# Patient Record
Sex: FEMALE | Race: BLACK OR AFRICAN AMERICAN | Employment: UNEMPLOYED | URBAN - METROPOLITAN AREA
[De-identification: names, ages, dates, MRNs, and addresses within clinical notes are randomized per-mention and may not be internally consistent; named-entity substitution may affect disease eponyms.]

---

## 2018-11-12 ENCOUNTER — HOSPITAL ENCOUNTER (EMERGENCY)
Age: 48
Discharge: HOME | End: 2018-11-12
Attending: EMERGENCY MEDICINE
Payer: SELF-PAY

## 2018-11-12 ENCOUNTER — APPOINTMENT (OUTPATIENT)
Dept: GENERAL RADIOLOGY | Age: 48
End: 2018-11-12
Attending: PHYSICIAN ASSISTANT
Payer: SELF-PAY

## 2018-11-12 ENCOUNTER — APPOINTMENT (OUTPATIENT)
Dept: CT IMAGING | Age: 48
End: 2018-11-12
Attending: PHYSICIAN ASSISTANT
Payer: SELF-PAY

## 2018-11-12 VITALS
OXYGEN SATURATION: 98 % | HEART RATE: 82 BPM | RESPIRATION RATE: 18 BRPM | DIASTOLIC BLOOD PRESSURE: 86 MMHG | SYSTOLIC BLOOD PRESSURE: 149 MMHG | HEIGHT: 64 IN | TEMPERATURE: 98.3 F | BODY MASS INDEX: 40.08 KG/M2 | WEIGHT: 234.79 LBS
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
## 2018-11-12 DIAGNOSIS — M25.551 PAIN OF RIGHT HIP JOINT: Primary | ICD-10-CM

## 2018-11-12 LAB — HCG UR QL: NEGATIVE

## 2018-11-12 PROCEDURE — 99283 EMERGENCY DEPT VISIT LOW MDM: CPT

## 2018-11-12 PROCEDURE — 73700 CT LOWER EXTREMITY W/O DYE: CPT

## 2018-11-12 PROCEDURE — 81025 URINE PREGNANCY TEST: CPT

## 2018-11-12 PROCEDURE — 74011250637 HC RX REV CODE- 250/637: Performed by: PHYSICIAN ASSISTANT

## 2018-11-12 PROCEDURE — 72100 X-RAY EXAM L-S SPINE 2/3 VWS: CPT

## 2018-11-12 RX ORDER — HYDROCODONE BITARTRATE AND ACETAMINOPHEN 7.5; 325 MG/1; MG/1
1 TABLET ORAL
Status: COMPLETED | OUTPATIENT
Start: 2018-11-12 | End: 2018-11-12

## 2018-11-12 RX ORDER — HYDROCODONE BITARTRATE AND ACETAMINOPHEN 5; 325 MG/1; MG/1
1 TABLET ORAL
Qty: 15 TAB | Refills: 0 | Status: SHIPPED | OUTPATIENT
Start: 2018-11-12 | End: 2022-04-19 | Stop reason: ALTCHOICE

## 2018-11-12 RX ORDER — CYCLOBENZAPRINE HCL 10 MG
10 TABLET ORAL
Qty: 15 TAB | Refills: 0 | Status: SHIPPED | OUTPATIENT
Start: 2018-11-12 | End: 2022-04-19 | Stop reason: ALTCHOICE

## 2018-11-12 RX ADMIN — HYDROCODONE BITARTRATE AND ACETAMINOPHEN 1 TABLET: 7.5; 325 TABLET ORAL at 21:24
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
## 2018-11-13 NOTE — ED NOTES
Patient has received discharge instructions from ER PA, verbalizes understanding. Ambulatory upon discharge with family driving.
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
## 2020-08-07 ENCOUNTER — TELEPHONE (OUTPATIENT)
Dept: CARDIOLOGY CLINIC | Age: 50
End: 2020-08-07

## 2020-08-07 ENCOUNTER — OFFICE VISIT (OUTPATIENT)
Dept: CARDIOLOGY CLINIC | Age: 50
End: 2020-08-07

## 2020-08-07 VITALS
SYSTOLIC BLOOD PRESSURE: 100 MMHG | RESPIRATION RATE: 16 BRPM | HEART RATE: 89 BPM | WEIGHT: 237 LBS | HEIGHT: 64 IN | DIASTOLIC BLOOD PRESSURE: 60 MMHG | OXYGEN SATURATION: 96 % | BODY MASS INDEX: 40.46 KG/M2

## 2020-08-07 DIAGNOSIS — R06.02 SOB (SHORTNESS OF BREATH): Primary | ICD-10-CM

## 2020-08-07 DIAGNOSIS — R06.02 SOB (SHORTNESS OF BREATH): ICD-10-CM

## 2020-08-07 DIAGNOSIS — Z98.890 H/O TRICUSPID VALVE REPAIR: ICD-10-CM

## 2020-08-07 DIAGNOSIS — Z95.0 PACEMAKER: ICD-10-CM

## 2020-08-07 DIAGNOSIS — Z95.2 H/O MITRAL VALVE REPLACEMENT: ICD-10-CM

## 2020-08-07 DIAGNOSIS — R60.0 LOCALIZED EDEMA: ICD-10-CM

## 2020-08-07 PROCEDURE — 99204 OFFICE O/P NEW MOD 45 MIN: CPT | Performed by: SPECIALIST

## 2020-08-07 PROCEDURE — 93000 ELECTROCARDIOGRAM COMPLETE: CPT | Performed by: SPECIALIST

## 2020-08-07 RX ORDER — ALBUTEROL SULFATE 90 UG/1
AEROSOL, METERED RESPIRATORY (INHALATION)
COMMUNITY
Start: 2019-05-21 | End: 2022-04-19 | Stop reason: SDUPTHER

## 2020-08-07 RX ORDER — POTASSIUM CHLORIDE 20 MEQ/1
TABLET, EXTENDED RELEASE ORAL
COMMUNITY

## 2020-08-07 RX ORDER — SPIRONOLACTONE 25 MG/1
25 TABLET ORAL DAILY
COMMUNITY
End: 2022-04-19 | Stop reason: ALTCHOICE

## 2020-08-07 RX ORDER — BUDESONIDE AND FORMOTEROL FUMARATE DIHYDRATE 80; 4.5 UG/1; UG/1
AEROSOL RESPIRATORY (INHALATION)
COMMUNITY

## 2020-08-07 RX ORDER — CARVEDILOL 3.12 MG/1
TABLET ORAL
COMMUNITY
End: 2022-04-19 | Stop reason: ALTCHOICE

## 2020-08-07 RX ORDER — FUROSEMIDE 40 MG/1
TABLET ORAL
COMMUNITY
End: 2022-04-19 | Stop reason: ALTCHOICE

## 2020-08-07 RX ORDER — HYDROCODONE BITARTRATE AND ACETAMINOPHEN 5; 325 MG/1; MG/1
1 TABLET ORAL
COMMUNITY
Start: 2019-09-25 | End: 2022-04-19 | Stop reason: ALTCHOICE

## 2020-08-07 RX ORDER — CHLORHEXIDINE GLUCONATE 4 G/100ML
SOLUTION TOPICAL
COMMUNITY
End: 2022-04-19 | Stop reason: ALTCHOICE

## 2020-08-07 RX ORDER — BUSPIRONE HYDROCHLORIDE 7.5 MG/1
TABLET ORAL
COMMUNITY
End: 2022-04-19 | Stop reason: ALTCHOICE

## 2020-08-07 RX ORDER — ERGOCALCIFEROL 1.25 MG/1
CAPSULE ORAL
COMMUNITY

## 2020-08-07 RX ORDER — CYCLOBENZAPRINE HCL 10 MG
10 TABLET ORAL
COMMUNITY
Start: 2019-01-24 | End: 2022-04-19 | Stop reason: ALTCHOICE

## 2020-08-07 NOTE — PROGRESS NOTES
HISTORY OF PRESENT ILLNESS  Carley Contreras is a 51 y.o. female     SUMMARY:   Problem List  Date Reviewed: 8/7/2020          Codes Class Noted    Chronic pain syndrome ICD-10-CM: G89.4  ICD-9-CM: 338.4  6/11/2014        Sacroiliitis, not elsewhere classified ([facility] ICD-10-CM: M46.1  ICD-9-CM: 720.2  6/11/2014        Lumbosacral spondylosis without myelopathy ICD-10-CM: M47.817  ICD-9-CM: 721.3  6/11/2014        Degeneration of lumbar or lumbosacral intervertebral disc ICD-10-CM: M51.37  ICD-9-CM: 722.52  6/11/2014        Myalgia and myositis, unspecified ICD-10-CM: [id number]  ICD-9-CM: 729.1  6/11/2014        Thoracic or lumbosacral neuritis or radiculitis, unspecified ICD-10-CM: [id number]  ICD-9-CM: 724.4  6/11/2014        Sciatica ICD-10-CM: M54.30  ICD-9-CM: 724.3  12/10/2013        Musculoskeletal pain ICD-10-CM: M79.18  ICD-9-CM: 729.1  12/10/2013        Asthma ICD-10-CM: J45.909  ICD-9-CM: 493.90  12/10/2013              Current Outpatient Medications on File Prior to Visit   Medication Sig    budesonide-formoteroL (SYMBICORT) 80-4.5 mcg/actuation HFAA budesonide-formoterol HFA 80 mcg-4.5 mcg/actuation aerosol inhaler    busPIRone (BUSPAR) 7.5 mg tablet buspirone 7.5 mg tablet    carvediloL (COREG) 3.125 mg tablet carvedilol 3.125 mg tablet    chlorhexidine (Hibiclens) 4 % liquid Hibiclens 4 % topical liquid    ergocalciferol (ERGOCALCIFEROL) 1,250 mcg (50,000 unit) capsule ergocalciferol (vitamin D2) 1,250 mcg (50,000 unit) capsule    furosemide (LASIX) 40 mg tablet furosemide 40 mg tablet    potassium chloride (K-DUR, KLOR-CON) 20 mEq tablet potassium chloride ER 20 mEq tablet,extended release(part/cryst)    spironolactone (ALDACTONE) 25 mg tablet Take 25 mg by mouth daily.  albuterol (Ventolin HFA) 90 mcg/actuation inhaler Ventolin HFA 90 mcg/actuation aerosol inhaler    cyclobenzaprine (FLEXERIL) 10 mg tablet Take 10 mg by mouth three (3) times daily as needed.     HYDROcodone-acetaminophen (NORCO) 5-325 mg per tablet Take 1 Tab by mouth every six (6) hours as needed.  cyclobenzaprine (FLEXERIL) 10 mg tablet Take 1 Tab by mouth three (3) times daily as needed for Muscle Spasm(s).  HYDROcodone-acetaminophen (NORCO) 5-325 mg per tablet Take 1 Tab by mouth every six (6) hours as needed for Pain. Max Daily Amount: 4 Tabs.  albuterol (VENTOLIN HFA) 90 mcg/actuation inhaler Take 2 Puffs by inhalation every six (6) hours as needed for Wheezing. No current facility-administered medications on file prior to visit. CARDIOLOGY STUDIES TO DATE:  No specialty comments available. Chief Complaint   Patient presents with    New Patient     HPI :  She is a 56-year-old self-referred for ongoing cardiac care. Apparently she had progressive mitral regurgitation and underwent a bovine mitral valve replacement at [facility], along with a tricuspid valvey repair in 2015 . The procedure was complicated by complete heart block and she had a pacemaker implant. She says they did not put in a mechanical valve because they were concerned about her ability to comply with Coumadin therapy. She has been followed by a cardiologist at [facility] named Dr. Davi Freeman. A few months ago she began to experience shortness of breath and leg edema and she says that an echo done in June showed that the valve was leaking severely and that something would need to be done. For a while she was on Entresto but she stopped that because of chest pain. She occasionally takes a single Coreg every day, she almost always takes her Lasix and most of the time takes her Spironolactone. She has chronic pain for which she intermittently takes narcotics and she has an anxiety disorder for which she is on BuSpar. She smokes and has hypertension. Cholesterol status is unknown. No history of diabetes and family history is negative for premature coronary disease. Her EKG today shows paced rhythm.   She does have a pacemaker device monitoring device at home and she has been getting regular checkups. CARDIAC ROS:   negative for chest pain, palpitations, syncope, orthopnea, paroxysmal nocturnal dyspnea, exertional chest pressure/discomfort, claudication    Family History   Problem Relation Age of Onset    Cancer Mother     Heart Disease Mother     Cancer Father     Heart Attack Sister     Diabetes Brother     Cancer Paternal Aunt     Hypertension Maternal Grandmother     Bipolar Disorder Son     Asthma Son     Headache Daughter        Past Medical History:   Diagnosis Date    Back pain     Chronic obstructive pulmonary disease ([address]     Foot pain        GENERAL ROS:  A comprehensive review of systems was negative except for that written in the HPI. Visit Vitals  /60 (BP 1 Location: Left arm, BP Patient Position: Sitting)   Pulse 89   Resp 16   Ht 5' 4\" (1.626 m)   Wt 237 lb (107.5 kg)   SpO2 96%   BMI 40.68 kg/m²       Wt Readings from Last 3 Encounters:   08/07/20 237 lb (107.5 kg)   11/12/18 234 lb 12.6 oz (106.5 kg)   06/11/14 231 lb (104.8 kg)            BP Readings from Last 3 Encounters:   08/07/20 100/60   11/12/18 149/86   06/11/14 120/78       PHYSICAL EXAM  General appearance: alert, cooperative, no distress, appears stated age  Neurologic: Alert and oriented X 3  Neck: supple, symmetrical, trachea midline, no adenopathy, no carotid bruit and no JVD  Lungs: clear to auscultation bilaterally  Heart: regular rate and rhythm, S1, S2 normal, no murmur, click, rub or gallop  Abdomen: soft, non-tender. Bowel sounds normal. No masses,  no organomegaly  Extremities: extremities normal, atraumatic, no cyanosis or edema  Pulses: 2+ and symmetric      ASSESSMENT :      She is not in overt heart failure at the moment. Her lungs are clear and I do not even hear a murmur. We need to get the information from her previous cardiologist and review all of that.   She seems to think that her valve can be treated percutaneously and I explained to her that this was not so simple for the mitral valve and in addition if she gets another bioprosthesis it will likely have premature failure because of her young age. Will review all her outside records and meet again. current treatment plan is effective, no change in therapy  lab results and schedule of future lab studies reviewed with patient  reviewed diet, exercise and weight control    Encounter Diagnoses   Name Primary?  SOB (shortness of breath) Yes     Orders Placed This Encounter    AMB POC EKG ROUTINE W/ 12 LEADS, INTER & REP    budesonide-formoteroL (SYMBICORT) 80-4.5 mcg/actuation HFAA    busPIRone (BUSPAR) 7.5 mg tablet    carvediloL (COREG) 3.125 mg tablet    chlorhexidine (Hibiclens) 4 % liquid    ergocalciferol (ERGOCALCIFEROL) 1,250 mcg (50,000 unit) capsule    furosemide (LASIX) 40 mg tablet    potassium chloride (K-DUR, KLOR-CON) 20 mEq tablet    spironolactone (ALDACTONE) 25 mg tablet    albuterol (Ventolin HFA) 90 mcg/actuation inhaler    cyclobenzaprine (FLEXERIL) 10 mg tablet    HYDROcodone-acetaminophen (NORCO) 5-325 mg per tablet       Follow-up and Dispositions    · Return in about 6 weeks (around 9/18/2020). Declan Pinto MD  8/7/2020  Please note that this dictation was completed with [employer], the computer voice recognition software. Quite often unanticipated grammatical, syntax, homophones, and other interpretive errors are inadvertently transcribed by the computer software. Please disregard these errors. Please excuse any errors that have escaped final proofreading. Thank you.

## 2020-08-07 NOTE — TELEPHONE ENCOUNTER
Faxed records request to [facility]. Patient was not  at the time she went there so requested records under her maiden name \"Kim. \"

## 2022-03-19 PROBLEM — Z95.2 H/O MITRAL VALVE REPLACEMENT: Status: ACTIVE | Noted: 2020-08-07

## 2022-03-19 PROBLEM — Z98.890 H/O TRICUSPID VALVE REPAIR: Status: ACTIVE | Noted: 2020-08-07

## 2022-03-19 PROBLEM — Z95.0 PACEMAKER: Status: ACTIVE | Noted: 2020-08-07

## 2022-04-19 ENCOUNTER — OFFICE VISIT (OUTPATIENT)
Dept: SURGERY | Age: 52
End: 2022-04-19
Payer: COMMERCIAL

## 2022-04-19 VITALS
TEMPERATURE: 96.9 F | WEIGHT: 245.3 LBS | SYSTOLIC BLOOD PRESSURE: 126 MMHG | BODY MASS INDEX: 41.88 KG/M2 | HEART RATE: 97 BPM | OXYGEN SATURATION: 99 % | DIASTOLIC BLOOD PRESSURE: 82 MMHG | HEIGHT: 64 IN

## 2022-04-19 DIAGNOSIS — K21.9 GASTROESOPHAGEAL REFLUX DISEASE, UNSPECIFIED WHETHER ESOPHAGITIS PRESENT: ICD-10-CM

## 2022-04-19 DIAGNOSIS — I50.9 CHRONIC CONGESTIVE HEART FAILURE, UNSPECIFIED HEART FAILURE TYPE (HCC): ICD-10-CM

## 2022-04-19 DIAGNOSIS — E66.01 MORBID OBESITY WITH BMI OF 40.0-44.9, ADULT (HCC): ICD-10-CM

## 2022-04-19 DIAGNOSIS — J45.909 ASTHMA, UNSPECIFIED ASTHMA SEVERITY, UNSPECIFIED WHETHER COMPLICATED, UNSPECIFIED WHETHER PERSISTENT: ICD-10-CM

## 2022-04-19 DIAGNOSIS — G47.33 OBSTRUCTIVE SLEEP APNEA SYNDROME: ICD-10-CM

## 2022-04-19 DIAGNOSIS — M19.90 ARTHRITIS: ICD-10-CM

## 2022-04-19 DIAGNOSIS — E66.01 MORBID OBESITY (HCC): Primary | ICD-10-CM

## 2022-04-19 PROCEDURE — 99205 OFFICE O/P NEW HI 60 MIN: CPT | Performed by: NURSE PRACTITIONER

## 2022-04-19 RX ORDER — DIAZEPAM 5 MG/1
5 TABLET ORAL ONCE
COMMUNITY
Start: 2021-09-14

## 2022-04-19 RX ORDER — IBUPROFEN 800 MG/1
TABLET ORAL
COMMUNITY
Start: 2021-08-04

## 2022-04-19 RX ORDER — BUMETANIDE 0.5 MG/1
TABLET ORAL DAILY
COMMUNITY

## 2022-04-19 RX ORDER — BUMETANIDE 0.5 MG/1
0.5 TABLET ORAL 2 TIMES DAILY
COMMUNITY
Start: 2022-03-16 | End: 2022-04-19 | Stop reason: SDUPTHER

## 2022-04-19 RX ORDER — PANTOPRAZOLE SODIUM 40 MG/1
40 TABLET, DELAYED RELEASE ORAL DAILY
COMMUNITY
Start: 2022-03-14 | End: 2023-03-14

## 2022-04-19 RX ORDER — ASPIRIN 81 MG/1
81 TABLET ORAL DAILY
COMMUNITY

## 2022-04-19 RX ORDER — SACUBITRIL AND VALSARTAN 24; 26 MG/1; MG/1
1 TABLET, FILM COATED ORAL 2 TIMES DAILY
COMMUNITY
Start: 2022-04-05

## 2022-04-19 NOTE — PROGRESS NOTES
Bariatric Surgery Consultation    Subjective: The patient is a 49 y.o. obese female with a Body mass index is 42.11 kg/m². .  The patient is at her heaviest weight for the past several years. she has been overweight since 2015 after having heart surgery. she has been considering surgery since 2017. she desires surgery at this time because of multiple health concerns and their lifestyle issues which are hindered by their weight. she has been referred by her family physician Dr Jose Hutton for evaluation and treatment of their obesity via surgical intervention.  Tim Palmer has tried multiple diets in her lifetime most recently tried physician supervised, behavior modification, unsupervised diets and Wellbutrin, Prozac and monthly dietitian visits     Bariatric comorbidities present are   Patient Active Problem List   Diagnosis Code    Sciatica M54.30    Musculoskeletal pain M79.18    Asthma J45.909    Chronic pain syndrome G89.4    Sacroiliitis, not elsewhere classified ([facility] M46.1    Lumbosacral spondylosis without myelopathy M47.817    Degeneration of lumbar or lumbosacral intervertebral disc M51.37    Myalgia and myositis, unspecified [id number]    Thoracic or lumbosacral neuritis or radiculitis, unspecified [id number]    H/O tricuspid valve repair Z98.890    H/O mitral valve replacement Z95.2    Pacemaker Z95.0    Morbid obesity ([address] E66.01    Morbid obesity with BMI of 40.0-44.9, adult ([address] E66.01, Z68.41    Pulmonary hypertension ([facility] I27.20    Congestive heart failure (CHF) (HCC) I50.9    Chronic obstructive pulmonary disease (HCC) J44.9    Fibroids D21.9    NAFLD (nonalcoholic fatty liver disease) K76.0    GERD (gastroesophageal reflux disease) K21.9    Degeneration, intervertebral disc, lumbar M51.36    Arthritis M19.90    Back pain M54.9    Heavy menses N92.0    Chronic back pain M54.9, G89.29    Sleep apnea G47.30    Former smoker Z87.891       The patient is considering laparoscopic adjustable gastric band surgery for surgical weight loss due to their ineffective progress with medical forms of weight loss and the urging of their physician who cares for their primary medical issues. The patient  now presents  for consideration for weight loss surgery understanding the benefits of this over a medical approach of weight loss as was discussed in our presentation on weight loss surgery. They have discussed their plans both with their family and primary care physician who is in support of their pursuit of such. The patient has not had health issues as of late and denies and gastrointestinal disturbances other than what is outlined below in their review of symptoms. All of their prior evaluations available by both their PCP's and specialists physicians have been reviewed today either in the Care Everywhere portal or scanned under the media tab. I have spent a large portion of my initial consultation today reviewing the patients current dietary habits which have contributed to their health issues and obesity. I have suggested to them personally a dietary regimen that they can initiate now to help with their status as it pertains to their weight. They understand that the most important aspect of their journey through their weight loss endeavor will be their adherence to a new lifestyle of healthy eating behavior. They also understand that an adherence to an exercise program will not only help with weight loss but is ultimately important in weight maintenance. The patients goal weight is 163 lb. These goals are consistent with expected outcomes of their desired operation. her Medical goals are resolution of these health issues.         Patient Active Problem List    Diagnosis Date Noted    Morbid obesity ([address]     Morbid obesity with BMI of 40.0-44.9, adult ([address]     Pulmonary hypertension ([address]     Congestive heart failure (CHF) (HCC)     Chronic obstructive pulmonary disease ([facility]     Fibroids     NAFLD (nonalcoholic fatty liver disease)     GERD (gastroesophageal reflux disease)     Degeneration, intervertebral disc, lumbar     Arthritis     Back pain     Heavy menses     Chronic back pain     Sleep apnea     Former smoker     H/O tricuspid valve repair 2020    H/O mitral valve replacement 2020    Pacemaker 2020    Chronic pain syndrome 2014    Sacroiliitis, not elsewhere classified ([facility] 2014    Lumbosacral spondylosis without myelopathy 2014    Degeneration of lumbar or lumbosacral intervertebral disc 2014    Myalgia and myositis, unspecified 2014    Thoracic or lumbosacral neuritis or radiculitis, unspecified 2014    Sciatica 12/10/2013    Musculoskeletal pain 12/10/2013    Asthma 12/10/2013     Past Surgical History:   Procedure Laterality Date    HX BUNIONECTOMY Left     HX GYN  2011    fibroid surgery    HX HEART CATHETERIZATION      HX HEART VALVE SURGERY      TAVR at [address] [address]  2015    RIGHT pacemaker at [facility] - S/p bioprosthetic MVR (27 mm Vazquez model 7300TFX, serial [id number])  and Tricuspid valve repair (26 mm Vazquez annuloplasty ring model 4500, serial [id number]) 10/29/15 at [facility] by Dr. Jake Venegas       Social History     Tobacco Use    Smoking status: Former Smoker     Packs/day: 1.50     Types: Cigarettes     Quit date:      Years since quittin.2    Smokeless tobacco: Never Used   Substance Use Topics    Alcohol use: Yes     Comment: 2x/month      Family History   Problem Relation Age of Onset    Cancer Mother     Heart Disease Mother     Cancer Father     Heart Attack Sister     Diabetes Brother     Cancer Paternal Aunt     Hypertension Maternal Grandmother     Bipolar Disorder Son     Asthma Son     Headache Daughter       Current Outpatient Medications   Medication Sig Dispense Refill    bumetanide (BUMEX) 0.5 mg tablet Take  by mouth daily.      Entresto 24-26 mg tablet Take 1 Tablet by mouth two (2) times a day.  pantoprazole (PROTONIX) 40 mg tablet Take 40 mg by mouth daily.  ibuprofen (MOTRIN) 800 mg tablet       diazePAM (VALIUM) 5 mg tablet Take 5 mg by mouth once.  aspirin delayed-release 81 mg tablet Take 81 mg by mouth daily.  budesonide-formoteroL (SYMBICORT) 80-4.5 mcg/actuation HFAA budesonide-formoterol HFA 80 mcg-4.5 mcg/actuation aerosol inhaler      ergocalciferol (ERGOCALCIFEROL) 1,250 mcg (50,000 unit) capsule ergocalciferol (vitamin D2) 1,250 mcg (50,000 unit) capsule      potassium chloride (K-DUR, KLOR-CON) 20 mEq tablet potassium chloride ER 20 mEq tablet,extended release(part/cryst)      albuterol (VENTOLIN HFA) 90 mcg/actuation inhaler Take 2 Puffs by inhalation every six (6) hours as needed for Wheezing.  1 Inhaler 0     Allergies   Allergen Reactions    Morphine Hives     Swollen face and legs    Oxycodone Itching          Review of Systems:       General - No history or complaints of unexpected fever, chills, or weight loss  Head/Neck - No history or complaints of headache, diplopia, dysphagia, hearing loss  Cardiac - No history or complaints of chest pain, palpitations, murmur, or shortness of breath  Pulmonary - No history or complaints of shortness of breath, productive cough, hemoptysis  Gastrointestinal - has reflux that is controlled with PPI,no  abdominal pain, obstipation/constipation or blood per rectum  Genitourinary - No history or complaints of hematuria/dysuria, stress urinary incontinence symptoms, or renal lithiasis  Musculoskeletal - has joint pain in their knees and low back,  no muscular weakness  Hematologic - No history or complaints of bleeding disorders,  No blood transfusions  Neurologic - No history or complaints of  migraine headaches, seizure activity, syncopal episodes, TIA or stroke  Integumentary - No history or complaints of rashes, abnormal nevi, skin cancer  Gynecological - Has heavy menses               Objective:     Visit Vitals  /82   Pulse 97   Temp 96.9 °F (36.1 °C)   Ht 5' 4\" (1.626 m)   Wt 111.3 kg (245 lb 4.8 oz)   SpO2 99%   BMI 42.11 kg/m²       Physical Examination: General appearance - alert, well appearing, and in no distress  Mental status - alert, oriented to person, place, and time  Eyes - pupils equal and reactive, extraocular eye movements intact  Neck - supple, no significant adenopathy  Lymphatics - no palpable lymphadenopathy, no hepatosplenomegaly  Chest - clear to auscultation, no wheezes, rales or rhonchi, symmetric air entry  Heart - normal rate, regular rhythm, normal S1, S2, no murmurs, rubs, clicks or gallops  Abdomen - soft, nontender, nondistended, no masses or organomegaly  Back exam - full range of motion, no tenderness, palpable spasm or pain on motion  Neurological - alert, oriented, normal speech, no focal findings or movement disorder noted  Musculoskeletal - no joint tenderness, deformity or swelling  Extremities - peripheral pulses normal, no pedal edema, no clubbing or cyanosis  Skin - normal coloration and turgor, no rashes, no suspicious skin lesions noted    US Liver 3/4/22  REPORT:   Coarsened increased hepatic parenchymal echogenicity corresponding to diffuse hepatic steatosis as correlated with comparison CT.  No hepatic contour nodularity.  Visualized portion pancreatic head neck junction is unremarkable.  Remainder of the pancreatic body and tail is obscured secondary to adjacent bowel gas.  No upper abdominal ascites.  Intrahepatic IVC is patent.  Hepatopetal portal venous flow demonstrated.      No hydronephrosis of the right kidney the previously demonstrated cortical cyst upper right kidney is not demonstrated sonographically on today's examination.  Visualized proximal common bile duct measures 6.4 mm.  No layering sludge or shadowing gallstones.  No gallbladder wall thickening or pericholecystic fluid demonstrated. IMPRESSION:   1. Diffuse hepatic steatosis. 2. No suspicious impact mass demonstrated. PFTs 1/6/22  Spirometry shows no evidence of obstructive ventilatory defect. There are reduced FEV1 and FVC. No significant bronchodilator response. Lung volume studies shows TLC at 65%, indicating restrictive ventilatory   defect. Normal RV. Elevated RV/TLC indicate air trapping. Diffusion study shows mildly reduced diffusion capacity which corrects for   alveolar unit. Impression:   No obstruction. Restrictive ventilatory defect with diffusion impairment concerning for   interstitial lung disease in the appropriate clinical context.  Sometimes   above findings can also be seen in obesity. CT ABD Pelvis W contrast 10/11/21  REPORT:   Lower thorax: Median sternotomy wires and prosthetic mitral and tricuspid grafts, stable. Liver: Normal.     Gallbladder and bile ducts: Normal.     Pancreas: Normal.     Spleen: Normal.     Adrenals: Normal.     Kidneys and ureters: 1.4 cm cyst upper pole right kidney. Vasculature: Normal.     Lymph nodes:  No adenopathy. Stomach and bowel: No bowel distention or bowel wall thickening. Urinary bladder: Normal.     Reproductive: Numerous calcified and noncalcified uterine fibroids are identified. Peritoneum/Abdominal wall:  Small fat containing umbilical hernia.  No free air or free fluid. Osseous structures: No fracture.  Disc height loss at the L4-5 and L5-S1 level.  L4 through S1 facet arthropathy.  Likely some degree of bilateral left greater than right significant L5 S1 neural foraminal stenosis. IMPRESSION:   1. No intra-inflammatory process. 2. Fibroid uterus. 3. Likely severe left-greater-than-right L5-S1 neural foraminal stenosis.  Likely some degree of neural foraminal impingement at this level. RAFIQ 9/30/21  FINDINGS   1.  Left ventricular function is reduced with an ejection fraction   estimated at 30% 2. Left atrium size is dilated   3. Right atrium size is dilated   4. Mildly enlarged right ventricle and mildly reduced function   5. There is no PFO/ASD by color flow Doppler   6. The interatrial septum is not aneurysmal.   7. Normal insertion of the pulmonary veins into the left atrium. 8. There are no clots in the left atrial appendage   9. Normal size of the pulmonary artery   10. The aortic valve is trileaflet with mild sclerotic changes   and mild-moderate AI.  There is significant post-stenotic   turbulence out of proportion to the degree of leaflet mobility   11. Status post Evaristo-TMVR.  Normal bioprosthetic valve gradients   (4 mmHg).  No significant MR   12. The tricuspid valve is s/p repair (ring). There is moderate   tricuspid regurgitation   13. The pulmonic valve is morphologically normal. There is   trivial pulmonic regurgitation   14. There is no pericardial effusion   15. The aorta has mild diffuse atheromatous disease. 16. Flow identified above TV and possibly connecting to LVOT.     Concern for post-operative Gerbode defect     IMPRESSION   1. Reduced left ventricular systolic function with an ejection   fraction of 30%   2. Normal bioprosthetic mitral valve function (Evaristo-TMVR)   3. Moderate TR s/p TV repair   4. Possible Gerbode defect       Labs:       No results found for this or any previous visit (from the past 1440 hour(s)). Assessment:     Morbid obesity with comorbidity    Plan:     laparoscopic adjustable gastric band surgery and laparoscopic sleeve gastrectomy    This is a 49 y.o. female with a BMI of Body mass index is 42.11 kg/m². and the weight-related co-morbidties as noted below. Carley meets the NIH criteria for bariatric surgery based upon the BMI of Body mass index is 42.11 kg/m². and multiple weight-related co-morbidties.  Carley has elected laparoscopic sleeve gastrectomy as her intervention of choice for treatment of morbid obestiy through surgical means secondary to its safety profile, rapid return to work  and decreases in operative risks over gastric bypass. In the office today, following Carley's history and physical examination, a 30 minute discussion regarding the anatomic alterations for the laparoscopic sleeve gastrectomy was undertaken. The dietary expectations and the patient and physician dependent factors for success were thoroughly discussed, to include the need for interval follow-up and long-term dietary changes associated with success. The possible complications of the sleeve gastrectomy  were also discussed, to include;death, DVT/PE, staple line leak, bleeding, stricture formation, infection, nutritional deficiencies and sleeve dilation. Specific weight related outcomes for success were also discussed with an emphasis on careful and close follow-up with the first year and eating behavior modification as the baseline and cyclical hunger return. The patient expressed an understanding of the above factors, and her questions were answered in their entirety. In addition, the patient watched a 1.5 hour power point seminar regarding obesity, surgical weight loss including, adjustable gastric band, gastric bypass, and sleeve gastrectomy. This discussion contrasted the different surgical techniques, mechanisms of actions and expected outcomes, and surgical and medical risks associated with each procedure. Today, the patient had all of her questions answered and desires to proceed with  bariatric surgery initially choosing sleeve gastrectomy as her surgical option. Ms Julia Dave has a very complex medical history that will require several clearances prior to surgery. She initially wanted to discuss having the lap band, but is receptive to considering sleeve gastrectomy if deemed an acceptable candidate. Will proceed with upper GI imaging to evaluate anatomy and degree of reflux.     Secondary Diagnoses:     Dietary Intervention  - The patient is currently scheduled to see or has been followed by a bariatric nutritionist for an attempt at preoperative weight loss as has been dictated by their insurance carrier. They will be assessed at various times during their follow up to evaluate their progress depending on the length of time that is required once again by their carrier. I have explained the importance of preoperative weight loss and the benefits regarding lower surgical risk and also assisting the patient in reaching their weight loss goal.  Finally they understand there is a physiologic benefit from the standpoint of hepatic volume reduction and reduction of central visceral adiposity preoperatively. I have reiterated the importance of a low carbohydrate and high protein regimen to achieve their stated goal. I have reviewed their current eating behavior prior to this encounter and explained to them in an exhaustive fashion the appropriate diet that they should adhere to. They have been encouraged to loose weight pre operatively and understand it is our prerogative to cancel surgery or postpone their procedure in the event of significant weight gain. Restrictive Airway Disease - We will continue all of their pulmonary medications in the form of oral pills and inhalers in both the perioperative and postoperative period. They understand that their symptoms should improve with weight loss. Any further testing related to this will be turned over to their family physician or pulmonologist. The patient understands that if they require oral or IV steroids in the future that they will notify us. This is particularly important for gastric bypass patients at all times and both sleeve gastrectomy and gastric bypass patients in the 1 month pre op and 1 month post operative period. They understand that inhaled steroids are exempt from this. Is aware of need for pulmonary clearance from [facility] pulmonology prior to surgery.     Obstructive Sleep Apnea -The patient understands the association of sleep apnea and obesity and the additional risk that it caries related to post surgical complications. If they have not been tested for sleep apnea and I feel they are at increased risk for this diagnosis, then they will be scheduled for a consultation with a Pulmonologist for such. In the event that they pam this diagnosis we will have the patient bring their CPAP machine to the hospital for use both postoperatively in the PACU and on the floor at its appropriate setting.  We will have them continue using it while at home after surgery and follow up with their pulmonologist 6 months after to be retested to see if it can be discontinued at that time period. GERD -The patient understands that weight loss surgery is not a guaranteed cure for reflux disease but does understand the benefits that weight loss can have on reflux disease. They also understand that at the time of surgery the gastroesophageal junction will be evaluated for the presence of a diaphragmatic hernia. Hernias will be corrected always with the gastric band and sleeve gastrectomy procedures, but only on a case by case basis with the gastric bypass. The patient also understands that neither weight loss surgery nor repair of a diaphragmatic hernia repair guarantees the complete cessation of the disease. Valvular Heart Disease with Heart Failure and possible Coronary Artery Disease - The patient has undergone or will undergo a preoperative evaluation by a cardiologist such that they are deemed a reasonable candidate for surgery. The patient understands that with a history of cardiac disease that there is always an increased risk compared to the average patient. Appropriate recommendations have been followed as recommended by the cardiologist.  The patients ASA will be resumed approximately 1 month postoperatively in a coated form. Is aware of need for cardiac clearance from Dr Collette Gains prior to surgery.     Weight Related Arthritis -The patient understands the benefits that weight loss surgery can have on their arthritis but also understands that weight loss is not a guaranteed cure and relief of symptoms is often dependent on the severity of the underlying disease. The patient also understands that traditional pharmaceutical treatments for this diagnosis are usually unavailable to post-operative weight loss patients due to the effects on the gastrointestinal tract. Any changes to the patients medication treatment will ultimately be made the patients PCP with input by our office.     Signed By: Navdeep Menon NP     April 19, 2022

## 2022-05-04 ENCOUNTER — APPOINTMENT (OUTPATIENT)
Dept: GENERAL RADIOLOGY | Age: 52
End: 2022-05-04
Attending: SPECIALIST
Payer: MEDICAID

## 2022-05-04 ENCOUNTER — APPOINTMENT (OUTPATIENT)
Dept: SURGERY | Age: 52
End: 2022-05-04

## 2022-05-04 ENCOUNTER — HOSPITAL ENCOUNTER (OUTPATIENT)
Age: 52
Setting detail: OUTPATIENT SURGERY
Discharge: HOME | End: 2022-05-04
Attending: SPECIALIST | Admitting: SPECIALIST
Payer: MEDICAID

## 2022-05-04 VITALS
HEIGHT: 64 IN | BODY MASS INDEX: 41.38 KG/M2 | HEART RATE: 88 BPM | RESPIRATION RATE: 17 BRPM | DIASTOLIC BLOOD PRESSURE: 84 MMHG | WEIGHT: 242.4 LBS | OXYGEN SATURATION: 98 % | SYSTOLIC BLOOD PRESSURE: 132 MMHG

## 2022-05-04 DIAGNOSIS — E66.01 MORBID OBESITY (HCC): ICD-10-CM

## 2022-05-04 DIAGNOSIS — E66.01 MORBID OBESITY WITH BMI OF 40.0-44.9, ADULT (HCC): ICD-10-CM

## 2022-05-04 PROCEDURE — 74011000250 HC RX REV CODE- 250: Performed by: SPECIALIST

## 2022-05-04 PROCEDURE — 76040000019: Performed by: SPECIALIST

## 2022-05-04 PROCEDURE — 74240 X-RAY XM UPR GI TRC 1CNTRST: CPT

## 2022-05-04 PROCEDURE — 74240 X-RAY XM UPR GI TRC 1CNTRST: CPT | Performed by: SPECIALIST

## 2022-05-04 NOTE — PROCEDURES
Patient:Carley Arguello   : 1970  Medical Record [id number]            PREPROCEDURE DIAGNOSIS: This patient is preoperative for laparoscopic sleeve gastrectomy procedure with a history of  reflux disease. POSTPROCEDURE DIAGNOSIS: This patient is preoperative for laparoscopic sleeve gastrectomy procedure with a history of  reflux disease. PROCEDURES PERFORMED: Upper GI study with barium. ESTIMATED BLOOD LOSS: None. SPECIMENS: None. STATEMENT OF MEDICAL NECESSITY: The patient is a patient with a  longstanding history of obesity. They are now considering the laparoscopic sleeve gastrectomy procedure as a means of surgical weight control and due to their history of reflux disease and are being assessed preoperatively for such. DESCRIPTION OF PROCEDURE: The patient was brought to the fluoroscopy unit and  was given thin barium. On swallowing of barium, they were noted to have  normal peristalsis of their esophagus. They had prompt filling of distal  esophagus with tapering into the gastroesophageal junction. There was no evidence of a hiatal hernia present. Contrast then filled the gastric cardia, fundus,body and pre pyloric region with no abnormalities noted. Contrast then exited the pylorus in normal fashion. No obstruction was noted. There was no evidence of reflux noted. (normal anatomy - complex pt with complex cardiac history. EF of 30% Sept . She states she is cleared by Dr. Coty Jung for an upcoming fibroid surgery at [facility].   She is also considering the band)    Juany Moreland MD

## 2022-06-20 ENCOUNTER — OFFICE VISIT (OUTPATIENT)
Dept: SURGERY | Age: 52
End: 2022-06-20
Payer: MEDICAID

## 2022-06-20 VITALS
HEART RATE: 88 BPM | BODY MASS INDEX: 40.6 KG/M2 | HEIGHT: 64 IN | WEIGHT: 237.8 LBS | OXYGEN SATURATION: 97 % | TEMPERATURE: 96.9 F | SYSTOLIC BLOOD PRESSURE: 116 MMHG | DIASTOLIC BLOOD PRESSURE: 77 MMHG

## 2022-06-20 DIAGNOSIS — I50.9 CHRONIC CONGESTIVE HEART FAILURE, UNSPECIFIED HEART FAILURE TYPE (HCC): Primary | ICD-10-CM

## 2022-06-20 DIAGNOSIS — K21.9 GASTROESOPHAGEAL REFLUX DISEASE, UNSPECIFIED WHETHER ESOPHAGITIS PRESENT: ICD-10-CM

## 2022-06-20 DIAGNOSIS — E66.01 MORBID OBESITY WITH BMI OF 40.0-44.9, ADULT (HCC): ICD-10-CM

## 2022-06-20 DIAGNOSIS — E66.01 MORBID OBESITY (HCC): ICD-10-CM

## 2022-06-20 DIAGNOSIS — K76.0 NAFLD (NONALCOHOLIC FATTY LIVER DISEASE): ICD-10-CM

## 2022-06-20 DIAGNOSIS — I27.20 PULMONARY HYPERTENSION (HCC): ICD-10-CM

## 2022-06-20 DIAGNOSIS — G47.33 OBSTRUCTIVE SLEEP APNEA SYNDROME: ICD-10-CM

## 2022-06-20 DIAGNOSIS — Z87.891 FORMER SMOKER: ICD-10-CM

## 2022-06-20 DIAGNOSIS — J45.909 ASTHMA, UNSPECIFIED ASTHMA SEVERITY, UNSPECIFIED WHETHER COMPLICATED, UNSPECIFIED WHETHER PERSISTENT: ICD-10-CM

## 2022-06-20 PROCEDURE — 99214 OFFICE O/P EST MOD 30 MIN: CPT | Performed by: SPECIALIST

## 2022-06-20 RX ORDER — METOLAZONE 5 MG/1
TABLET ORAL
COMMUNITY
Start: 2022-05-23

## 2022-06-20 RX ORDER — ONDANSETRON 4 MG/1
TABLET, FILM COATED ORAL
COMMUNITY
Start: 2022-05-12

## 2022-06-20 RX ORDER — DOCUSATE SODIUM 100 MG/1
CAPSULE, LIQUID FILLED ORAL
COMMUNITY
Start: 2022-05-12

## 2022-06-20 RX ORDER — DICYCLOMINE HYDROCHLORIDE 20 MG/1
TABLET ORAL
COMMUNITY
Start: 2022-04-24

## 2022-06-20 RX ORDER — NALOXONE HYDROCHLORIDE 4 MG/.1ML
SPRAY NASAL
COMMUNITY
Start: 2022-05-12

## 2022-06-20 RX ORDER — METHOCARBAMOL 500 MG/1
TABLET, FILM COATED ORAL
COMMUNITY
Start: 2021-09-13

## 2022-06-20 RX ORDER — DESVENLAFAXINE SUCCINATE 50 MG/1
TABLET, EXTENDED RELEASE ORAL
COMMUNITY
Start: 2022-05-23

## 2022-06-20 RX ORDER — CLOBETASOL PROPIONATE 0.5 MG/G
CREAM TOPICAL AS NEEDED
COMMUNITY
Start: 2021-11-01

## 2022-06-20 NOTE — PROGRESS NOTES
Bariatric Surgery Consultation  Original consult in mid April with LORENZO Qureshi with a weight of 245 lbs    Subjective: The patient is a 49 y.o. obese female with a Body mass index is 40.82 kg/m². .  The patient is currently her heaviest weight for the past several years. she has been overweight since her mid 35s. she has been considering surgery since last year. she desires surgery at this time because of multiple health concerns and their lifestyle issues which are hindered by their weight. she has been referred by his family physician for evaluation and treatment of their obesity via surgical intervention.  Cliff Dukes has tried multiple diets in her lifetime most recently tried physician supervised, behavior modification and unsupervised diets    Bariatric comorbidities present are   Patient Active Problem List   Diagnosis Code    Sciatica M54.30    Musculoskeletal pain M79.18    Asthma J45.909    Chronic pain syndrome G89.4    Sacroiliitis, not elsewhere classified ([facility] M46.1    Lumbosacral spondylosis without myelopathy M47.817    Degeneration of lumbar or lumbosacral intervertebral disc M51.37    Myalgia and myositis, unspecified [id number]    Thoracic or lumbosacral neuritis or radiculitis, unspecified [id number]    H/O tricuspid valve repair Z98.890    H/O mitral valve replacement Z95.2    Pacemaker Z95.0    Morbid obesity ([address] E66.01    Morbid obesity with BMI of 40.0-44.9, adult ([facility] E66.01, Z68.41    Pulmonary hypertension ([facility] I27.20    Congestive heart failure (CHF) (HCC) I50.9    Chronic obstructive pulmonary disease (HCC) J44.9    Fibroids D21.9    NAFLD (nonalcoholic fatty liver disease) K76.0    GERD (gastroesophageal reflux disease) K21.9    Degeneration, intervertebral disc, lumbar M51.36    Arthritis M19.90    Back pain M54.9    Heavy menses N92.0    Chronic back pain M54.9, G89.29    Sleep apnea G47.30    Former smoker Z87.891       The patient is considering laparoscopic sleeve gastrectomy for surgical weight loss due to their ineffective progress with medical forms of weight loss and the urging of their physician who cares for their primary medical issues. The patient  now presents  for consideration for weight loss surgery understanding the benefits of this over a medical approach of weight loss as was discussed in our presentation on weight loss surgery. They have discussed their plans both with their family and primary care physician who is in support of their pursuit of such. The patient has had no health issues as of late and denies and gastrointestinal disturbances other than what is outlined below in their review of symptoms. All of their prior evaluations available by both their PCP's and specialists physicians have been reviewed today either in the Care Everywhere portal or scanned under the media tab. I have spent a large portion of my initial consultation today reviewing the patients current dietary habits which have contributed to their health issues and obesity. I have suggested to them personally a dietary regimen that they can initiate now to help with their status as it pertains to their weight. They understand that the most important aspect of their journey through their weight loss endeavor will be their adherence to a new lifestyle of healthy eating behavior. They also understand that an adherence to an exercise program will not only help with weight loss but is ultimately important in weight maintenance. The patients goal weight is 157 lb.      Patient Active Problem List    Diagnosis Date Noted    Morbid obesity ([address]     Morbid obesity with BMI of 40.0-44.9, adult ([address]     Pulmonary hypertension ([address]     Congestive heart failure (CHF) (HCC)     Chronic obstructive pulmonary disease (HCC)     Fibroids     NAFLD (nonalcoholic fatty liver disease)     GERD (gastroesophageal reflux disease)     Degeneration, intervertebral disc, lumbar     Arthritis     Back pain     Heavy menses     Chronic back pain     Sleep apnea     Former smoker     H/O tricuspid valve repair 2020    H/O mitral valve replacement 2020    Pacemaker 2020    Chronic pain syndrome 2014    Sacroiliitis, not elsewhere classified ([facility] 2014    Lumbosacral spondylosis without myelopathy 2014    Degeneration of lumbar or lumbosacral intervertebral disc 2014    Myalgia and myositis, unspecified 2014    Thoracic or lumbosacral neuritis or radiculitis, unspecified 2014    Sciatica 12/10/2013    Musculoskeletal pain 12/10/2013    Asthma 12/10/2013     Past Surgical History:   Procedure Laterality Date    HX BUNIONECTOMY Left     HX GYN  2011    fibroid surgery    HX HEART CATHETERIZATION      HX HEART VALVE SURGERY      TAVR at [address] [place] [address]  2022    with myomectomy     HX MITRAL VALVE REPLACEMENT      RIGHT pacemaker at [address] [place] [address] - S/p bioprosthetic MVR (27 mm Vazquez model 7300TFX, serial [id number])  and Tricuspid valve repair (26 mm Vazquez annuloplasty ring model 4500, serial [id number]) [place] [facility] [place]       Social History     Tobacco Use    Smoking status: Former Smoker     Packs/day: 1.50     Types: Cigarettes     Quit date:      Years since quittin.4    Smokeless tobacco: Never Used   Substance Use Topics    Alcohol use: Yes     Comment: 2x/month      Family History   Problem Relation Age of Onset    Cancer Mother     Heart Disease Mother     Cancer Father     Heart Attack Sister     Diabetes Brother     Cancer Paternal Aunt     Hypertension Maternal Grandmother     Bipolar Disorder Son     Asthma Son     Headache Daughter       Current Outpatient Medications   Medication Sig Dispense Refill    clobetasoL (TEMOVATE) 0.05 % topical cream Apply  to affected area as needed.       desvenlafaxine succinate (PRISTIQ) 50 mg ER tablet Take 1 tablet (50 mg) by mouth daily      dicyclomine (BENTYL) 20 mg tablet TAKE 1 TABLET (20 MG TOTAL) BY MOUTH EVERY 6 (SIX) HOURS FOR 5 DAYS      docusate sodium (COLACE) 100 mg capsule       methocarbamoL (ROBAXIN) 500 mg tablet Take 1 tablet (500mg) by mouth every 4 hours as needed      metOLazone (ZAROXOLYN) 5 mg tablet TAKE ONE TABLET (5 mg) BY MOUTH every other DAY.  Narcan 4 mg/actuation nasal spray       ondansetron hcl (ZOFRAN) 4 mg tablet       bumetanide (BUMEX) 0.5 mg tablet Take  by mouth daily.  Entresto 24-26 mg tablet Take 1 Tablet by mouth two (2) times a day.  pantoprazole (PROTONIX) 40 mg tablet Take 40 mg by mouth daily.  ibuprofen (MOTRIN) 800 mg tablet       diazePAM (VALIUM) 5 mg tablet Take 5 mg by mouth once.  aspirin delayed-release 81 mg tablet Take 81 mg by mouth daily.  budesonide-formoteroL (SYMBICORT) 80-4.5 mcg/actuation HFAA budesonide-formoterol HFA 80 mcg-4.5 mcg/actuation aerosol inhaler      ergocalciferol (ERGOCALCIFEROL) 1,250 mcg (50,000 unit) capsule ergocalciferol (vitamin D2) 1,250 mcg (50,000 unit) capsule      potassium chloride (K-DUR, KLOR-CON) 20 mEq tablet potassium chloride ER 20 mEq tablet,extended release(part/cryst)      albuterol (VENTOLIN HFA) 90 mcg/actuation inhaler Take 2 Puffs by inhalation every six (6) hours as needed for Wheezing.  1 Inhaler 0     Allergies   Allergen Reactions    Escitalopram Anxiety    Fentanyl Hives, Itching and Other (comments)    Morphine Hives     Swollen face and legs    Oxycodone Itching    Sertraline Other (comments)    Spironolactone Other (comments)     Other reaction(s): Cutaneous eruption (morphologic abnormality)        Review of Systems:     General - No history or complaints of unexpected fever, chills, or weight loss  Head/Neck - No history or complaints of headache, diplopia, dysphagia, hearing loss  Cardiac - No history or complaints of chest pain, palpitations, murmur, or shortness of breath  Pulmonary - No history or complaints of shortness of breath, productive cough, hemoptysis  Gastrointestinal - (+) reflux, no abdominal pain, obstipation/constipation or blood per rectum  Genitourinary - No history or complaints of hematuria/dysuria, stress urinary incontinence symptoms, or renal lithiasis  Musculoskeletal - mild joint pain in their knees,  no muscular weakness  Hematologic - No history or complaints of bleeding disorders,  No blood transfusions  Neurologic - No history or complaints of  migraine headaches, seizure activity, syncopal episodes, TIA or stroke  Integumentary - No history or complaints of rashes, abnormal nevi, skin cancer  Gynecological - unremarkable    Objective:     Visit Vitals  /77 (BP 1 Location: Right upper arm, BP Patient Position: Sitting, BP Cuff Size: Large adult long)   Pulse 88   Temp 96.9 °F (36.1 °C)   Ht 5' 4\" (1.626 m)   Wt 107.9 kg (237 lb 12.8 oz)   SpO2 97%   BMI 40.82 kg/m²     Physical Examination: General appearance - alert, well appearing, and in no distress  Mental status - alert, oriented to person, place, and time  Eyes - pupils equal and reactive, extraocular eye movements intact  Nose - normal and patent, no erythema, discharge or polyps  Mouth - mucous membranes moist, pharynx normal without lesions  Neck - supple, no significant adenopathy  Lymphatics - no palpable lymphadenopathy, no hepatosplenomegaly  Chest - clear to auscultation, no wheezes, rales or rhonchi, symmetric air entry  Heart - normal rate, regular rhythm, normal S1, S2, no murmurs, rubs, clicks or gallops  Abdomen - soft, nontender, nondistended, no masses or organomegaly  Back exam - full range of motion, no tenderness, palpable spasm or pain on motion  Neurological - alert, oriented, normal speech, no focal findings or movement disorder noted  Musculoskeletal - no joint tenderness, deformity or swelling  Extremities - peripheral pulses normal, no pedal edema, no clubbing or cyanosis  Skin - normal coloration and turgor, no rashes, no suspicious skin lesions noted    Labs:       No results found for this or any previous visit (from the past 1440 hour(s)). Assessment:     Morbid obesity with comorbidity    Complex pt with complex cardiac history. EF of 30% Sept 2021. She states she was cleared by Dr. Camille Sahw for the below noted fibroid surgery at [facility]    Since her April consult she had the following encounter -     Op Note - Eitan Nicole MD - 05/09/2022 9:15 AM EDT  Formatting of this note is different from the original.  Procedure Note    Procedure(s): HYSTEROSCOPY/ DILATION & CURETTAGE  LAPAROTOMY /W MULTIPLE MYOMECTOMY & REMOVAL/TREATMENT/REPAIR OF DISEASED/DAMAGED TISSUE. REMOVAL OF ADNEXAL CYST. LYSIS OF ADHESIONS. Pre-op Diagnosis  * Uterine leiomyoma, unspecified location [D25.9]  * Pelvic pain in female [R10.2]  * Dyspareunia, female [N94.10]    Post-op Diagnosis  * Uterine leiomyoma, unspecified location [D25.9]  * Pelvic pain in female [R10.2]  * Dyspareunia, female [N94.10]    Procedure Details     OPERATIVE NOTE    Date of procedure: 05/09/22     Pre-operative diagnosis:   1. Uterine fibroids  2. Pelvic pain  3. Dyspareunia  4. Morbid obesity    Post op diagnosis:  1-4. same  5. Pelvic adhesions  6. Right paratubal cyst    Procedure:   1. Hysteroscopy, Dilation and Curettage of uterus  2. Laparotomy with multiple myomectomy, lysis of adhesions, and excision right paratubal cyst    Surgeon: Eitan Nicole MD     Assistant: Dr. Konrad Barroso    Anesthesia: General    Complications: none    Blood loss: 50 ml     Her UGI was normal     She has not yet had a dietary visit    The patient will make sure she sees her cardiologist once again for formal clearance for our procedure when the time comes. Plan:     laparoscopic sleeve gastrectomy    This is a 49 y.o. female with a BMI of Body mass index is 40.82 kg/m².  and the weight-related co-morbidties as noted below. Carley meets the NIH criteria for bariatric surgery based upon the BMI of Body mass index is 40.82 kg/m². and multiple weight-related co-morbidties. Carley has elected laparoscopic sleeve gastrectomy as her intervention of choice for treatment of morbid obestiy through surgical means secondary to its safety profile, rapid return to work  and decreases in operative risks over gastric bypass. In the office today, following Carley's history and physical examination, a 30 minute discussion regarding the anatomic alterations for the laparoscopic sleeve gastrectomy was undertaken. The dietary expectations and the patient and physician dependent factors for success were thoroughly discussed, to include the need for interval follow-up and long-term dietary changes associated with success. The possible complications of the sleeve gastrectomy  were also discussed, to include;death, DVT/PE, staple line leak, bleeding, stricture formation, infection, nutritional deficiencies and sleeve dilation. Specific weight related outcomes for success were also discussed with an emphasis on careful and close follow-up with the first year and eating behavior modification as the baseline and cyclical hunger return. The patient expressed an understanding of the above factors, and her questions were answered in their entirety. In addition, the patient attended a 1.5 hour power point seminar regarding obesity, surgical weight loss including, adjustable gastric band, gastric bypass, and sleeve gastrectomy. This discussion contrasted the different surgical techniques, mechanisms of actions and expected outcomes, and surgical and medical risks associated with each procedure. During this seminar, there was a long question and answer session where each questions was answered until there were no additional questions.      Today, the patient had all of her questions answered and desires to proceed with bariatric surgery initially choosing sleeve gastrectomy as her surgical option. Secondary Diagnoses:     Dietary Intervention  - The patient is currently scheduled to see or has been followed by a bariatric nutritionist for an attempt at preoperative weight loss as has been dictated by their insurance carrier. They will be assessed at various times during their follow up to evaluate their progress depending on the length of time that is required once again by their carrier. I have explained the importance of preoperative weight loss and the benefits regarding lower surgical risk and also assisting the patient in reaching their weight loss goal.  Finally they understand their is a physiologic benefit from the standpoint of hepatic volume reduction preoperatively. I have reiterated the importance of a low carbohydrate and high protein regimen to achieve their stated goal.     GERD -The patient understands that weight loss surgery is not a guaranteed cure for reflux disease but does understand the benefits that weight loss can have on reflux disease. They also understand that at the time of surgery the gastroesophageal junction will be evaluated for the presence of a diaphragmatic hernia. Hernias will be corrected always with the gastric band and sleeve gastrectomy procedures, but only on a case by case basis with the gastric bypass if it prevents our ability to perform the operation at hand, or if I feel that they would benefit long term with correction of this issue. The patient also understands that neither weight loss surgery nor repair of a diaphragmatic hernia repair guarantees the complete cessation of the disease. They also understand there is a possibility of recurrence with a simple crural repair as is performed with these procedures. They understand they may have to continue their medications in the postoperative period.  They have a good understanding that the gastric bypass procedure is better suited to total resolution of this issue and that neither the Lap Band nor sleeve gastrectomy is considered a curative procedure as it pertains to this diagnosis. Obstructive Sleep Apnea -The patient understands the association of sleep apnea and obesity and the additional risk that it caries related to post surgical complications. We will have the patient bring their CPAP machine to the hospital for use both postoperatively in the PACU and on the floor at its appropriate setting.  We will have them continue using it while at home after surgery and follow up with their pulmonologist 6 months after to be retested to see if it can be discontinued at that time period. Weight Related Arthritis -The patient understands the benefits that weight loss surgery can have on their arthritis but also understands that weight loss is not a guaranteed cure and relief of symptoms is often dependent on the severity of the underlying disease.  The patient also understands that traditional pharmaceutical treatments for this diagnosis are usually unavailable to post-operative weight loss patients due to the effects on the gastrointestinal tract particularly with the gastric bypass and to a lesser effect with the sleeve gastrectomy.  Any changes to the patients medication treatment will ultimately be made the patients PCP with input by our office. Restrictive Airway Disease - We will continue all of their pulmonary medications in the form of oral pills and inhalers in both the perioperative and postoperative period. They understand that their symptoms should improve with weight loss.  Any further testing related to this will be turned over to their family physician or pulmonologist.     Signed By: Dwayne Jauregui MD     June 23, 2022

## 2022-09-28 ENCOUNTER — OFFICE VISIT (OUTPATIENT)
Dept: SURGERY | Age: 52
End: 2022-09-28
Payer: MEDICAID

## 2022-09-28 VITALS
TEMPERATURE: 96.8 F | SYSTOLIC BLOOD PRESSURE: 138 MMHG | OXYGEN SATURATION: 100 % | BODY MASS INDEX: 42.22 KG/M2 | WEIGHT: 247.3 LBS | HEART RATE: 88 BPM | DIASTOLIC BLOOD PRESSURE: 75 MMHG | HEIGHT: 64 IN

## 2022-09-28 DIAGNOSIS — J45.909 ASTHMA, UNSPECIFIED ASTHMA SEVERITY, UNSPECIFIED WHETHER COMPLICATED, UNSPECIFIED WHETHER PERSISTENT: ICD-10-CM

## 2022-09-28 DIAGNOSIS — M19.90 ARTHRITIS: ICD-10-CM

## 2022-09-28 DIAGNOSIS — Z87.891 FORMER SMOKER: ICD-10-CM

## 2022-09-28 DIAGNOSIS — I27.20 PULMONARY HYPERTENSION (HCC): ICD-10-CM

## 2022-09-28 DIAGNOSIS — G47.33 OBSTRUCTIVE SLEEP APNEA SYNDROME: ICD-10-CM

## 2022-09-28 DIAGNOSIS — I50.9 CHRONIC CONGESTIVE HEART FAILURE, UNSPECIFIED HEART FAILURE TYPE (HCC): ICD-10-CM

## 2022-09-28 DIAGNOSIS — K21.9 GASTROESOPHAGEAL REFLUX DISEASE, UNSPECIFIED WHETHER ESOPHAGITIS PRESENT: ICD-10-CM

## 2022-09-28 DIAGNOSIS — E66.01 MORBID OBESITY (HCC): ICD-10-CM

## 2022-09-28 DIAGNOSIS — E66.01 MORBID OBESITY WITH BMI OF 40.0-44.9, ADULT (HCC): Primary | ICD-10-CM

## 2022-09-28 DIAGNOSIS — K76.0 NAFLD (NONALCOHOLIC FATTY LIVER DISEASE): ICD-10-CM

## 2022-09-28 PROCEDURE — 99213 OFFICE O/P EST LOW 20 MIN: CPT | Performed by: SPECIALIST

## 2022-09-28 RX ORDER — CHLORHEXIDINE GLUCONATE 4 G/100ML
SOLUTION TOPICAL
COMMUNITY
Start: 2022-08-09

## 2022-09-28 RX ORDER — PREDNISONE 10 MG/1
TABLET ORAL
COMMUNITY
Start: 2022-08-15

## 2022-09-28 RX ORDER — FERROUS GLUCONATE 324(37.5)
TABLET ORAL
COMMUNITY
Start: 2022-08-15

## 2022-10-24 NOTE — PROGRESS NOTES
Bariatric Surgery Consultation  Original consult in mid April with LORENZO Qureshi with a weight of 245 lbs    Subjective:     Donal Dubose is a 47 y.o. obese female with a Body mass index is 42.45 kg/m². Yanet Vilchisers she desires surgery at this time because of health issues and quality of life issues. Donal uDbose has tried multiple diets in her lifetime most recently tried physician supervised, behavior modification, and unsupervised diets. Bariatric comorbidities present are   Patient Active Problem List   Diagnosis Code    Sciatica M54.30    Musculoskeletal pain M79.18    Asthma J45.909    Chronic pain syndrome G89.4    Sacroiliitis, not elsewhere classified ([address] M46.1    Lumbosacral spondylosis without myelopathy M47.817    Degeneration of lumbar or lumbosacral intervertebral disc M51.37    Myalgia and myositis, unspecified [id number]    Thoracic or lumbosacral neuritis or radiculitis, unspecified [id number]    H/O tricuspid valve repair Z98.890    H/O mitral valve replacement Z95.2    Pacemaker Z95.0    Morbid obesity ([address] E66.01    Morbid obesity with BMI of 40.0-44.9, adult ([address] E66.01, Z68.41    Pulmonary hypertension ([address] I27.20    Congestive heart failure (CHF) (HCC) I50.9    Chronic obstructive pulmonary disease (HCC) J44.9    Fibroids D21.9    NAFLD (nonalcoholic fatty liver disease) K76.0    GERD (gastroesophageal reflux disease) K21.9    Degeneration, intervertebral disc, lumbar M51.36    Arthritis M19.90    Back pain M54.9    Heavy menses N92.0    Chronic back pain M54.9, G89.29    Sleep apnea G47.30    Former smoker Z87.891     The patient desires laparoscopic sleeve gastrectomy for surgical weight loss. Donal Dubose is here today to check progress with weight loss / evaluate nutritional status and review all subspecialty clearances in hopes of proceeding to the operating room.      Patient Active Problem List    Diagnosis Date Noted    Morbid obesity ([address]     Morbid obesity with BMI of 40.0-44.9, adult [facility])     Pulmonary hypertension (HCC)     Congestive heart failure (CHF) (HCC)     Chronic obstructive pulmonary disease (HCC)     Fibroids     NAFLD (nonalcoholic fatty liver disease)     GERD (gastroesophageal reflux disease)     Degeneration, intervertebral disc, lumbar     Arthritis     Back pain     Heavy menses     Chronic back pain     Sleep apnea     Former smoker     H/O tricuspid valve repair 2020    H/O mitral valve replacement 2020    Pacemaker 2020    Chronic pain syndrome 2014    Sacroiliitis, not elsewhere classified ([address] 2014    Lumbosacral spondylosis without myelopathy 2014    Degeneration of lumbar or lumbosacral intervertebral disc 2014    Myalgia and myositis, unspecified 2014    Thoracic or lumbosacral neuritis or radiculitis, unspecified 2014    Sciatica 12/10/2013    Musculoskeletal pain 12/10/2013    Asthma 12/10/2013      Past Surgical History:   Procedure Laterality Date    HX BUNIONECTOMY Left     HX GYN  2011    fibroid surgery    HX HEART CATHETERIZATION      HX HEART VALVE SURGERY      TAVR at [address] [place] [address] St  2022    with myomectomy     HX MITRAL VALVE REPLACEMENT  2015    RIGHT pacemaker at [facility] - S/p bioprosthetic MVR (27 mm Vazquez model 7300TFX, serial [id number])  and Tricuspid valve repair (26 mm Vazquez annuloplasty ring model 4500, serial [id number]) [place] [facility] [place] Asha Farrell       Social History     Tobacco Use    Smoking status: Former     Packs/day: 1.50     Types: Cigarettes     Quit date:      Years since quittin.8    Smokeless tobacco: Never   Substance Use Topics    Alcohol use: Yes     Comment: 2x/month      Family History   Problem Relation Age of Onset    Cancer Mother     Heart Disease Mother     Cancer Father     Heart Attack Sister     Diabetes Brother     Cancer Paternal Aunt     Hypertension Maternal Grandmother     Bipolar Disorder Son     Asthma Son     Headache Daughter       Current Outpatient Medications   Medication Sig Dispense Refill    predniSONE (DELTASONE) 10 mg tablet PLEASE SEE ATTACHED FOR DETAILED DIRECTIONS      ferrous gluconate 324 mg (37.5 mg iron) tablet Take 1 tablet (324 mg total) by mouth daily with breakfast      chlorhexidine (HIBICLENS) 4 % liquid       clobetasoL (TEMOVATE) 0.05 % topical cream Apply  to affected area as needed. desvenlafaxine succinate (PRISTIQ) 50 mg ER tablet Take 1 tablet (50 mg) by mouth daily      dicyclomine (BENTYL) 20 mg tablet TAKE 1 TABLET (20 MG TOTAL) BY MOUTH EVERY 6 (SIX) HOURS FOR 5 DAYS      docusate sodium (COLACE) 100 mg capsule       methocarbamoL (ROBAXIN) 500 mg tablet Take 1 tablet (500mg) by mouth every 4 hours as needed      bumetanide (BUMEX) 0.5 mg tablet Take  by mouth daily. pantoprazole (PROTONIX) 40 mg tablet Take 40 mg by mouth daily. ibuprofen (MOTRIN) 800 mg tablet       diazePAM (VALIUM) 5 mg tablet Take 5 mg by mouth once. aspirin delayed-release 81 mg tablet Take 81 mg by mouth daily. budesonide-formoteroL (SYMBICORT) 80-4.5 mcg/actuation HFAA budesonide-formoterol HFA 80 mcg-4.5 mcg/actuation aerosol inhaler      ergocalciferol (ERGOCALCIFEROL) 1,250 mcg (50,000 unit) capsule ergocalciferol (vitamin D2) 1,250 mcg (50,000 unit) capsule      potassium chloride (K-DUR, KLOR-CON) 20 mEq tablet potassium chloride ER 20 mEq tablet,extended release(part/cryst)      albuterol (VENTOLIN HFA) 90 mcg/actuation inhaler Take 2 Puffs by inhalation every six (6) hours as needed for Wheezing. 1 Inhaler 0    metOLazone (ZAROXOLYN) 5 mg tablet TAKE ONE TABLET (5 mg) BY MOUTH every other DAY. (Patient not taking: Reported on 9/28/2022)      Narcan 4 mg/actuation nasal spray  (Patient not taking: Reported on 9/28/2022)      ondansetron hcl (ZOFRAN) 4 mg tablet  (Patient not taking: Reported on 9/28/2022)      Entresto 24-26 mg tablet Take 1 Tablet by mouth two (2) times a day.  (Patient not taking: Reported on 9/28/2022)       Allergies   Allergen Reactions    Escitalopram Anxiety    Fentanyl Hives, Itching and Other (comments)    Morphine Hives     Swollen face and legs    Oxycodone Itching    Sertraline Other (comments)    Spironolactone Other (comments)     Other reaction(s): Cutaneous eruption (morphologic abnormality)          Review of Systems:        General - No history or complaints of unexpected fever, chills, or weight loss  Head/Neck - No history or complaints of headache, diplopia, dysphagia, hearing loss  Cardiac - No history or complaints of chest pain, palpitations, murmur, or shortness of breath  Pulmonary - No history or complaints of shortness of breath, productive cough, hemoptysis  Gastrointestinal - No history or complaints of reflux,  abdominal pain, obstipation/constipation, blood per rectum  Genitourinary - No history or complaints of hematuria/dysuria, stress urinary incontinence symptoms, or renal lithiasis  Musculoskeletal - No history or complaints of joint pain or muscular weakness  Hematologic - No history or complaints of bleeding disorders, blood transfusions, sickle cell anemia  Neurologic - No history or complaints of  migraine headaches, seizure activity, syncopal episodes, TIA or stroke  Integumentary - No history or complaints of rashes, abnormal nevi, skin cancer  Gynecological - No history of heavy menses/abnormal menses    Objective:     Visit Vitals  /75   Pulse 88   Temp 96.8 °F (36 °C)   Ht 5' 4\" (1.626 m)   Wt 112.2 kg (247 lb 4.8 oz)   SpO2 100%   BMI 42.45 kg/m²     Physical Examination: General appearance - alert, well appearing, and in no distress  Mental status - alert, oriented to person, place, and time  Eyes - pupils equal and reactive, extraocular eye movements intact  Ears - external ear canals normal  Nose - normal and patent, no erythema, discharge or polyps  Mouth - mucous membranes moist, pharynx normal without lesions  Neck - supple, no significant adenopathy  Lymphatics - no palpable lymphadenopathy, no hepatosplenomegaly  Chest - clear to auscultation, no wheezes, rales or rhonchi, symmetric air entry  Heart - normal rate, regular rhythm, normal S1, S2, no murmurs, rubs, clicks or gallops  Abdomen - soft, nontender, nondistended, no masses or organomegaly  Back exam - full range of motion, no tenderness, palpable spasm or pain on motion  Neurological - alert, oriented, normal speech, no focal findings or movement disorder noted  Musculoskeletal - no joint tenderness, deformity or swelling  Extremities - peripheral pulses normal, no pedal edema, no clubbing or cyanosis  Skin - normal coloration and turgor, no rashes, no suspicious skin lesions noted    Labs:             Assessment:     Morbid obesity with associated comorbidity     Plan:     Continuation of Pre-Operative evaluation / clearance. Chau Walters has returned to the office today to discuss her status as a surgical candidate.  her progress has been noted and reviewed. We will continue the pre-operative process and work towards goals as outlined. she has 0 more pounds to lose before proceeding to the OR. (2 pounds gained since initial consult visit 5 months ago)  she has several more nutritional visits to complete before proceeding to the OR  she has an outstanding cardiac clearance to review before proceeding to the OR. Carley Albarado understand the rationales for all the above. It has been discussed that given her obese condition that the best surgical option for this patient would be the laparoscopic sleeve gastrectomy. Chau Walters agrees with the surgical choice and has been educated in it's; risks, benefits, and alternatives. We will continue with the pre-operative evaluation as needed to check progress.     The patient understands the plan of action    She states she was cleared by Dr. Magnolia Christian for a May 2022 fibroid surgery at [facility]    She will meet with office staff today to discuss criteria     Secondary Diagnoses:     Dietary Intervention  - The patient is currently scheduled to see or has been followed by a bariatric nutritionist for an attempt at preoperative weight loss as has been dictated by their insurance carrier. They will be assessed at various times during their follow up to evaluate their progress depending on the length of time that is required once again by their carrier. I have explained the importance of preoperative weight loss and the benefits regarding lower surgical risk and also assisting the patient in reaching their weight loss goal.  Finally they understand their is a physiologic benefit from the standpoint of hepatic volume reduction preoperatively. I have reiterated the importance of a low carbohydrate and high protein regimen to achieve their stated goal.     GERD -The patient understands that weight loss surgery is not a guaranteed cure for reflux disease but does understand the benefits that weight loss can have on reflux disease. They also understand that at the time of surgery the gastroesophageal junction will be evaluated for the presence of a diaphragmatic hernia. Hernias will be corrected always with the gastric band and sleeve gastrectomy procedures, but only on a case by case basis with the gastric bypass if it prevents our ability to perform the operation at hand, or if I feel that they would benefit long term with correction of this issue. The patient also understands that neither weight loss surgery nor repair of a diaphragmatic hernia repair guarantees the complete cessation of the disease. They also understand there is a possibility of recurrence with a simple crural repair as is performed with these procedures. They understand they may have to continue their medications in the postoperative period.  They have a good understanding that the gastric bypass procedure is better suited to total resolution of this issue and that neither the Lap Band nor sleeve gastrectomy is considered a curative procedure as it pertains to this diagnosis. Obstructive Sleep Apnea -The patient understands the association of sleep apnea and obesity and the additional risk that it caries related to post surgical complications. We will have the patient bring their CPAP machine to the hospital for use both postoperatively in the PACU and on the floor at its appropriate setting. We will have them continue using it while at home after surgery and follow up with their pulmonologist 6 months after to be retested to see if it can be discontinued at that time period. Weight Related Arthritis -The patient understands the benefits that weight loss surgery can have on their arthritis but also understands that weight loss is not a guaranteed cure and relief of symptoms is often dependent on the severity of the underlying disease. The patient also understands that traditional pharmaceutical treatments for this diagnosis are usually unavailable to post-operative weight loss patients due to the effects on the gastrointestinal tract particularly with the gastric bypass and to a lesser effect with the sleeve gastrectomy. Any changes to the patients medication treatment will ultimately be made the patients PCP with input by our office. Restrictive Airway Disease - We will continue all of their pulmonary medications in the form of oral pills and inhalers in both the perioperative and postoperative period. They understand that their symptoms should improve with weight loss.  Any further testing related to this will be turned over to their family physician or pulmonologist.       Signed By: Scott Bridges     October 24, 2022

## 2023-07-21 ENCOUNTER — OFFICE VISIT (OUTPATIENT)
Age: 53
End: 2023-07-21
Payer: COMMERCIAL

## 2023-07-21 VITALS
OXYGEN SATURATION: 98 % | TEMPERATURE: 97 F | HEART RATE: 79 BPM | WEIGHT: 244.9 LBS | BODY MASS INDEX: 41.81 KG/M2 | HEIGHT: 64 IN | DIASTOLIC BLOOD PRESSURE: 60 MMHG | SYSTOLIC BLOOD PRESSURE: 102 MMHG

## 2023-07-21 DIAGNOSIS — E66.01 MORBID OBESITY WITH BMI OF 40.0-44.9, ADULT (HCC): ICD-10-CM

## 2023-07-21 DIAGNOSIS — I50.9 CONGESTIVE HEART FAILURE, UNSPECIFIED HF CHRONICITY, UNSPECIFIED HEART FAILURE TYPE (HCC): ICD-10-CM

## 2023-07-21 DIAGNOSIS — Z95.0 PACEMAKER: ICD-10-CM

## 2023-07-21 DIAGNOSIS — Z87.891 FORMER SMOKER: ICD-10-CM

## 2023-07-21 DIAGNOSIS — M19.90 ARTHRITIS: ICD-10-CM

## 2023-07-21 DIAGNOSIS — K21.9 GASTROESOPHAGEAL REFLUX DISEASE, UNSPECIFIED WHETHER ESOPHAGITIS PRESENT: ICD-10-CM

## 2023-07-21 DIAGNOSIS — G47.30 SLEEP APNEA, UNSPECIFIED TYPE: ICD-10-CM

## 2023-07-21 DIAGNOSIS — J45.20 MILD INTERMITTENT ASTHMA, UNSPECIFIED WHETHER COMPLICATED: ICD-10-CM

## 2023-07-21 DIAGNOSIS — I27.20 PULMONARY HYPERTENSION (HCC): ICD-10-CM

## 2023-07-21 DIAGNOSIS — E66.01 MORBID OBESITY (HCC): Primary | ICD-10-CM

## 2023-07-21 PROCEDURE — 99215 OFFICE O/P EST HI 40 MIN: CPT | Performed by: SPECIALIST

## 2023-07-21 RX ORDER — ALBUTEROL SULFATE 2.5 MG/3ML
SOLUTION RESPIRATORY (INHALATION)
COMMUNITY
Start: 2023-06-20

## 2023-07-21 RX ORDER — MONTELUKAST SODIUM 10 MG/1
TABLET ORAL
COMMUNITY
Start: 2023-06-21

## 2023-07-21 RX ORDER — TIOTROPIUM BROMIDE INHALATION SPRAY 1.56 UG/1
SPRAY, METERED RESPIRATORY (INHALATION)
COMMUNITY
Start: 2023-06-21

## 2023-07-21 ASSESSMENT — PATIENT HEALTH QUESTIONNAIRE - PHQ9
SUM OF ALL RESPONSES TO PHQ9 QUESTIONS 1 & 2: 0
SUM OF ALL RESPONSES TO PHQ QUESTIONS 1-9: 0
1. LITTLE INTEREST OR PLEASURE IN DOING THINGS: 0
2. FEELING DOWN, DEPRESSED OR HOPELESS: 0
SUM OF ALL RESPONSES TO PHQ QUESTIONS 1-9: 0

## 2023-08-21 ENCOUNTER — CLINICAL DOCUMENTATION (OUTPATIENT)
Facility: HOSPITAL | Age: 53
End: 2023-08-21

## 2023-12-20 PROBLEM — Z72.0 NICOTINE USE: Status: ACTIVE | Noted: 2023-12-20

## 2024-01-17 ENCOUNTER — HOSPITAL ENCOUNTER (OUTPATIENT)
Facility: HOSPITAL | Age: 54
Discharge: HOME | End: 2024-01-20

## 2024-01-17 VITALS — WEIGHT: 244.4 LBS | HEIGHT: 64 IN | BODY MASS INDEX: 41.73 KG/M2

## 2024-01-17 NOTE — PROGRESS NOTES
Medical Weight Loss Multi-Disciplinary Program    Patient's Name: Ariel Bearden Age: 53 y.o.   YOB: 1970 Sex: female     Session #1. Pt attended in-person class.  Weight obtained in office.    Date: 1/17/2024    Vitals:    01/17/24 1645   Weight: 110.9 kg (244 lb 6.4 oz)   Height: 1.626 m (5' 4\")      Body mass index is 41.95 kg/m².     Pounds Lost since last month: N/A Pounds Gained since last month: N/A       Starting Weight: 244 lbs Previous Month’s Weight: N/A   Overall Pounds Lost: 0.0 lbs  Overall Pounds Gained: 0.4 lbs       Class Guidelines    Guidelines are reviewed with patient at the start of every class.    1. Patient understands that weight loss trial classes must be consecutive.  Patient understands if they miss a class, it is their responsibility to contact me to reschedule class.  I will reach out to patient after their first no show.  2.  Patient understands the expectations that weight maintenance/weight loss is expected during the classes.  Failure to demonstrate changes may result in extension of weight loss trial, followed by returning to see the surgeon.  Patient understands that they CANNOT gain any weight during the weight loss trial.  Gaining weight will result in extension of weight loss trial.  3. Patient is also instructed to complete their labwork, psychological evaluation visit, and any other tests that the surgeon has ordered while they are working on their weight loss trial.  4.  Patient was instructed to bring their packet of nutrition education materials to every class and appointment.        Eating Habits and Behaviors    Reviewed intake  Understanding low-carbohydrate/low-sugar/low-fat, higher protein meals  Instruction given for personal dietary changes  Discussed perceived compliance    Comments: RD Reviewed Diet History and Physical Activity/Exercise habits.  Recommended dietary changes discussed for both before and after surgery.     Today in class we

## 2024-02-22 ENCOUNTER — HOSPITAL ENCOUNTER (OUTPATIENT)
Facility: HOSPITAL | Age: 54
Discharge: HOME | End: 2024-02-25

## 2024-02-22 VITALS — BODY MASS INDEX: 42.44 KG/M2 | WEIGHT: 248.6 LBS | HEIGHT: 64 IN

## 2024-02-22 NOTE — PROGRESS NOTES
how to set SMART goals.  Pt was given examples of barriers to change and ways to persist in the face of barriers.    Patient's current diet habits include:  Patient is eating 1-2 meals and 0 snacks per day.  Pt has found 1 protein supplement shakes for use post-op in meeting their protein needs.      Patient's plan for dietary and/or behavior changes in the upcoming month: \"portion control, more exercise\".     Physical Activity/Exercise    Comments:  We talked about exercise.  Patient was given reasons of why exercise is so important and how that can help with their long-term success.  I have encouraged patient to get a support system to help with the activity.    We talked about recommended types of physical activity, including walking, swimming, cycling, or chair exercises.  I also talked with patient about doing some strength training, which helps the metabolism, as well as strengthen muscles and bones.  Patient's plan to incorporate more activity includes: \"Walking treadmill 2-3 x a week\".       Behavior Modification     Comments:  During today's class, I discussed vitamin and mineral supplementation essential for health and prevention of malnutrition in depth.  Patient was directed to the handouts on vitamins and minerals found on pages 27-33 that were provided in class handouts packet as well as a handout titled, \"Nutrient Deficiency and it's symptoms\".  I reviewed the vitamins and minerals that need to be supplemented, dosage recommendations, functions of supplements and signs of deficiencies, as well as examples of specific supplements.  Reviewed briefly the requirement  differences between laparoscopic gastric bypass, laparoscopic sleeve gastrectomy, and lap-band procedures, while encouraging all patients to continue supplements for life no matter what bariatric surgery they are preparing for.    Goals:   Work to increase to 3-4 small meals per day, with 1-2 planned snacks as needed.  Recommend following

## 2024-03-12 ENCOUNTER — HOSPITAL ENCOUNTER (OUTPATIENT)
Facility: HOSPITAL | Age: 54
Discharge: HOME | End: 2024-03-15

## 2024-03-12 VITALS — WEIGHT: 245 LBS | BODY MASS INDEX: 41.83 KG/M2 | HEIGHT: 64 IN

## 2024-03-12 NOTE — PROGRESS NOTES
Well-Child Check-up (Infant/Toddler)  Your child just had a routine checkup to check how well he or she is growing and developing. During the checkup, the healthcare provider likely did the following:  · Weighed your child and measured your child’s height  · Performed a thorough physical exam on your child  · Assessed certain skills in your child (including language and other cognitive abilities, movement, or behavior)    · Asked you questions about how well your child is sleeping or eating  · Asked you questions about your child’s bowel and urinary habits  · Gave your child one or more shots (vaccines) to protect against specific illnesses  · Talked with you about ways to keep your child healthy and safe  Based on your child’s exam today, there are no signs of problems.  Home care  · Keep feeding your child as you have been or as directed by the healthcare provider.  · Watch for any new or unusual symptoms as advised by the provider.  Follow-up care  Follow up with your child’s healthcare provider as directed. Be sure you know the date of your child’s next routine checkup. Also, start a list of questions for the next visit with the provider. Bring the list with you to the next visit.  When to seek medical advice  Unless your child’s healthcare provider advises otherwise, call the provider right away if:  · Your child is 3 months old or younger and has a fever of 100.4°F (38°C) or higher. (Seek treatment right away. A fever in a young baby can be a sign of a serious infection.)  · Your child is younger than 2 years of age and has a fever of 100.4°F (38°C) that lasts for more than 1 day.  · Your child is 2 years old or older and has a fever of 100.4°F (38°C) that lasts for more than 3 days.  · Your child is any age and has repeated fevers above 104°F (40°C).  Also call the provider right away if your child has any of these symptoms:  · Not breastfeeding or eating well  · Poor weight gain or weight loss  · New or  unusual rash  · Fast breathing or trouble breathing  · Ear pain, stomach pain, or sore throat with painful swallowing  · Pain with urination or smelly urine  · No wet diapers for 8 hours, no tears when crying, \"sunken\" eyes, or dry mouth  · White patches in the mouth that cannot be wiped away  · Ongoing diarrhea or constipation  · Ongoing vomiting or inability to keep down fluids  · Unusual fussiness or crying that won’t stop  · Unusual drowsiness or slowed body movements  · Other physical or behavioral symptoms that concern you    Date Last Reviewed: 7/26/2015  © 2412-9827 [employer]. [address] [place] [id number]. All rights reserved. This information is not intended as a substitute for professional medical care. Always follow your healthcare professional's instructions.    Closure:  The parent/guardian understands that this is a provisional diagnosis. Provisional diagnosis can and do change. The diagnosis that you are discharged with today is based on the symptoms with which you presented today. If any new symptoms occur or worsen, you should seek immediate attention for re-evaluation.  Any symptoms that persist or fail to completely resolve require further evaluation by your other healthcare provider(s).       parts to focus on while selecting foods.  In depth discussion was provided and goals reviewed for: protein, fat and carbohydrates using the serving size, as well as ingredients to watch out for in the ingredients list when selecting bariatric-friendly food options.  Pt was encouraged to follow the 3 gram rule.  In depth instruction was provided over the components of the nutrition label to assist pts in feeling confident when selecting foods.  The food exceptions to the label rules were explained in detail.  Also reviewed, were label claims and what these mean to the patient selecting food both pre- and post-operatively.      Goals:   Work to increase to 3-4 small meals per day, with 1-2 planned snacks as needed.  Recommend following plate method for meal planning - focusing on lean protein, non-starchy vegetables, and measured amounts of starch.   - Goal of  g protein and  g carbohydrate per day.               - Select at least 2 DIFFERENT protein supplements that can be used for protein supplementation to meet goals pre- and post-operatively.   -Practice Carbohydrate Counting and label reading   -Follow 3 g rule for fat and sugar.  2. Slow down meals  - Chew each bite 25-35 times.  -Aim for 20-30 min/meal.  3. Increase non caloric fluid to 64 oz per day.  Eliminate caffeine, added sugar, carbonation, and straws.               -Continue to work to decrease sugar sweetened beverages - goal of calorie free beverages only              -Must eliminate caffeine prior to surgery and avoid for ~6-8 weeks   -Practice 30:30 rule,  food and flood   4. Start activity regimen, work to increase ADL  5. Start Complete MVI and probiotic at least 30 days pre-op.

## 2024-03-25 ENCOUNTER — OFFICE VISIT (OUTPATIENT)
Age: 54
End: 2024-03-25
Payer: COMMERCIAL

## 2024-03-25 VITALS
BODY MASS INDEX: 41.35 KG/M2 | WEIGHT: 242.2 LBS | OXYGEN SATURATION: 100 % | HEART RATE: 91 BPM | SYSTOLIC BLOOD PRESSURE: 112 MMHG | HEIGHT: 64 IN | DIASTOLIC BLOOD PRESSURE: 60 MMHG | TEMPERATURE: 97.4 F

## 2024-03-25 DIAGNOSIS — I50.9 CONGESTIVE HEART FAILURE, UNSPECIFIED HF CHRONICITY, UNSPECIFIED HEART FAILURE TYPE (HCC): ICD-10-CM

## 2024-03-25 DIAGNOSIS — G47.30 SLEEP APNEA, UNSPECIFIED TYPE: ICD-10-CM

## 2024-03-25 DIAGNOSIS — E66.01 MORBID OBESITY WITH BMI OF 40.0-44.9, ADULT (HCC): ICD-10-CM

## 2024-03-25 DIAGNOSIS — F17.200 SMOKER: ICD-10-CM

## 2024-03-25 DIAGNOSIS — E66.01 MORBID OBESITY (HCC): Primary | ICD-10-CM

## 2024-03-25 DIAGNOSIS — K21.9 GASTROESOPHAGEAL REFLUX DISEASE, UNSPECIFIED WHETHER ESOPHAGITIS PRESENT: ICD-10-CM

## 2024-03-25 PROCEDURE — 99214 OFFICE O/P EST MOD 30 MIN: CPT | Performed by: NURSE PRACTITIONER

## 2024-03-25 NOTE — PROGRESS NOTES
candidate for surgery. The patient understands that with a history of cardiac disease that there is always an increased risk compared to the average patient.  Appropriate recommendations have been followed as recommended by the cardiologist.  The patients ASA will be resumed approximately 1 month postoperatively in a coated form. Is aware of need for cardiac clearance from Dr Gutiérrez prior to surgery.     Weight Related Arthritis -The patient understands the benefits that weight loss surgery can have on their arthritis but also understands that weight loss is not a guaranteed cure and relief of symptoms is often dependent on the severity of the underlying disease.  The patient also understands that traditional pharmaceutical treatments for this diagnosis are usually unavailable to post-operative weight loss patients due to the effects on the gastrointestinal tract.  Any changes to the patient’s medication treatment will ultimately be made the patient’s PCP with input by our office.    Smoking Cessation - Today I have counseled the patient extensively regarding smoking cessation.  They have been counseled extensively about the detrimental effects of smoking on their weight loss surgical procedure particularly for the gastric bypass and sleeve gastrectomy procedures.  They understand that smoking leads to pulmonary issues postoperatively and can lead to gastric ulcers and marginal ulcers in the post bariatric surgery pouch that has been created.  They understand that they must stop smoking prior to surgery or it may affect their ultimate progression to their procedure.    Ms. Bearden has a reminder for a \"due or due soon\" health maintenance. I have asked that she contact her primary care provider for follow-up on this health maintenance.      Signed By: DEBRA Alvarez - NP     March 25, 2024

## 2024-03-25 NOTE — PATIENT INSTRUCTIONS
any of the above may cause ulcers in your stomach which may perforate causing a medical emergency and surgery. Speak to our medical staff if another medical provider requires you to take steroids or NSAIDs.          Supplement Resource Guide    Importance of Protein:   Maintains lean body mass, produces antibodies to fight off infections, heals wounds, minimizes hair loss, helps to give you energy, helps with satiety, and keeping you full between meals.    Importance of Calcium:  Needed for healthy bones and teeth, normal blood clotting, and nervous system functioning, higher risk of osteoporosis and bone disease with non-compliance.    Importance of Multivitamins:  Many functions.  Supply you with extra nutrients that you may be missing from food.  May lead to iron deficiency anemia, weakness, fatigue, and many other symptoms with non-compliance.    Importance of B Vitamins:  Important for red blood cell formation, metabolism, energy, and helps to maintain a healthy nervous system.    Protein Supplement  Find one you like now. Use immediately after surgery.   Look for:  35-50g protein each day from your protein supplement once you reach the progression diet.      0-3 g fat per serving  0-3 g sugar per serving    Protein drinks should be split in separate dosages.    Recommend: Lifelong  1 year + Calcium Supplement:     Start taking within a month after surgery.   Look for: Calcium Citrate Plus D (1500 mg per day)  Recommend: Citracal     .            Avoid chocolate chewable calcium. Can use chewable bariatric or [employer] brand or similar chewable.    The body cannot absorb more than 500-600 mg of calcium at a time.      Take for Life Multi-vitamin Supplement:      Start immediately after surgery: any complete chewable, such as: [place]’s Complete chewables.    Avoid [place] sours or gummies.  They lack iron and other important nutrients and also have added sugar.    Continue with chewable vitamin or change to

## 2025-08-05 ENCOUNTER — APPOINTMENT (OUTPATIENT)
Facility: HOSPITAL | Age: 55
End: 2025-08-05
Payer: MEDICARE

## 2025-08-05 ENCOUNTER — APPOINTMENT (OUTPATIENT)
Facility: HOSPITAL | Age: 55
End: 2025-08-05
Attending: EMERGENCY MEDICINE
Payer: MEDICARE

## 2025-08-05 ENCOUNTER — HOSPITAL ENCOUNTER (EMERGENCY)
Facility: HOSPITAL | Age: 55
Discharge: HOME | End: 2025-08-05
Attending: EMERGENCY MEDICINE
Payer: MEDICARE

## 2025-08-05 VITALS
WEIGHT: 240 LBS | OXYGEN SATURATION: 95 % | HEIGHT: 64 IN | HEART RATE: 66 BPM | DIASTOLIC BLOOD PRESSURE: 74 MMHG | TEMPERATURE: 97.9 F | RESPIRATION RATE: 21 BRPM | BODY MASS INDEX: 40.97 KG/M2 | SYSTOLIC BLOOD PRESSURE: 136 MMHG

## 2025-08-05 DIAGNOSIS — G62.9 NEUROPATHY: ICD-10-CM

## 2025-08-05 DIAGNOSIS — M79.602 LEFT ARM PAIN: Primary | ICD-10-CM

## 2025-08-05 LAB
ALBUMIN SERPL-MCNC: 4.5 G/DL (ref 3.4–5)
ALBUMIN/GLOB SERPL: 1.4 (ref 0.8–1.7)
ALP SERPL-CCNC: 87 U/L (ref 45–117)
ALT SERPL-CCNC: 37 U/L (ref 10–35)
ANION GAP SERPL CALC-SCNC: 14 MMOL/L (ref 3–18)
AST SERPL-CCNC: 38 U/L (ref 10–38)
BASOPHILS # BLD: 0.03 K/UL (ref 0–0.1)
BASOPHILS NFR BLD: 0.4 % (ref 0–2)
BILIRUB SERPL-MCNC: 1.2 MG/DL (ref 0.2–1)
BUN SERPL-MCNC: 13 MG/DL (ref 6–23)
BUN/CREAT SERPL: 15 (ref 12–20)
CALCIUM SERPL-MCNC: 10.2 MG/DL (ref 8.5–10.1)
CHLORIDE SERPL-SCNC: 97 MMOL/L (ref 98–107)
CO2 SERPL-SCNC: 28 MMOL/L (ref 21–32)
CREAT SERPL-MCNC: 0.85 MG/DL (ref 0.6–1.3)
DIFFERENTIAL METHOD BLD: ABNORMAL
ECHO BSA: 2.22 M2
EKG ATRIAL RATE: 76 BPM
EKG DIAGNOSIS: NORMAL
EKG Q-T INTERVAL: 536 MS
EKG QRS DURATION: 190 MS
EKG QTC CALCULATION (BAZETT): 557 MS
EKG R AXIS: 68 DEGREES
EKG T AXIS: -87 DEGREES
EKG VENTRICULAR RATE: 65 BPM
EOSINOPHIL # BLD: 0.13 K/UL (ref 0–0.4)
EOSINOPHIL NFR BLD: 1.5 % (ref 0–5)
ERYTHROCYTE [DISTWIDTH] IN BLOOD BY AUTOMATED COUNT: 12.3 % (ref 11.6–14.5)
FLUAV RNA SPEC QL NAA+PROBE: NOT DETECTED
FLUBV RNA SPEC QL NAA+PROBE: NOT DETECTED
GLOBULIN SER CALC-MCNC: 3.3 G/DL (ref 2–4)
GLUCOSE SERPL-MCNC: 75 MG/DL (ref 74–108)
HCT VFR BLD AUTO: 45.1 % (ref 35–45)
HGB BLD-MCNC: 15.1 G/DL (ref 12–16)
IMM GRANULOCYTES # BLD AUTO: 0.01 K/UL (ref 0–0.04)
IMM GRANULOCYTES NFR BLD AUTO: 0.1 % (ref 0–0.5)
LYMPHOCYTES # BLD: 3.31 K/UL (ref 0.9–3.3)
LYMPHOCYTES NFR BLD: 38.7 % (ref 21–52)
MAGNESIUM SERPL-MCNC: 2 MG/DL (ref 1.6–2.6)
MCH RBC QN AUTO: 30.6 PG (ref 24–34)
MCHC RBC AUTO-ENTMCNC: 33.5 G/DL (ref 31–37)
MCV RBC AUTO: 91.3 FL (ref 78–100)
MONOCYTES # BLD: 0.68 K/UL (ref 0.05–1.2)
MONOCYTES NFR BLD: 8 % (ref 3–10)
NEUTS SEG # BLD: 4.39 K/UL (ref 1.8–8)
NEUTS SEG NFR BLD: 51.3 % (ref 40–73)
NRBC # BLD: 0 K/UL (ref 0–0.01)
NRBC BLD-RTO: 0 PER 100 WBC
NT PRO BNP: 208 PG/ML (ref 36–900)
PLATELET # BLD AUTO: 217 K/UL (ref 135–420)
PMV BLD AUTO: 9.9 FL (ref 9.2–11.8)
POTASSIUM SERPL-SCNC: 4.6 MMOL/L (ref 3.5–5.5)
PROT SERPL-MCNC: 7.8 G/DL (ref 6.4–8.2)
RBC # BLD AUTO: 4.94 M/UL (ref 4.2–5.3)
SARS-COV-2 RNA RESP QL NAA+PROBE: NOT DETECTED
SODIUM SERPL-SCNC: 139 MMOL/L (ref 136–145)
SOURCE: NORMAL
TROPONIN T SERPL HS-MCNC: 8.9 NG/L (ref 0–14)
WBC # BLD AUTO: 8.6 K/UL (ref 4.6–13.2)

## 2025-08-05 PROCEDURE — 87636 SARSCOV2 & INF A&B AMP PRB: CPT

## 2025-08-05 PROCEDURE — 99285 EMERGENCY DEPT VISIT HI MDM: CPT

## 2025-08-05 PROCEDURE — 83880 ASSAY OF NATRIURETIC PEPTIDE: CPT

## 2025-08-05 PROCEDURE — 6370000000 HC RX 637 (ALT 250 FOR IP): Performed by: STUDENT IN AN ORGANIZED HEALTH CARE EDUCATION/TRAINING PROGRAM

## 2025-08-05 PROCEDURE — 96374 THER/PROPH/DIAG INJ IV PUSH: CPT

## 2025-08-05 PROCEDURE — 93971 EXTREMITY STUDY: CPT

## 2025-08-05 PROCEDURE — 71045 X-RAY EXAM CHEST 1 VIEW: CPT

## 2025-08-05 PROCEDURE — 84484 ASSAY OF TROPONIN QUANT: CPT

## 2025-08-05 PROCEDURE — 80053 COMPREHEN METABOLIC PANEL: CPT

## 2025-08-05 PROCEDURE — 6360000002 HC RX W HCPCS: Performed by: STUDENT IN AN ORGANIZED HEALTH CARE EDUCATION/TRAINING PROGRAM

## 2025-08-05 PROCEDURE — 85025 COMPLETE CBC W/AUTO DIFF WBC: CPT

## 2025-08-05 PROCEDURE — 83735 ASSAY OF MAGNESIUM: CPT

## 2025-08-05 RX ORDER — KETOROLAC TROMETHAMINE 15 MG/ML
15 INJECTION, SOLUTION INTRAMUSCULAR; INTRAVENOUS ONCE
Status: COMPLETED | OUTPATIENT
Start: 2025-08-05 | End: 2025-08-05

## 2025-08-05 RX ORDER — CAPSAICIN 0.07 G/100G
CREAM TOPICAL
Qty: 120 G | Refills: 0 | Status: SHIPPED | OUTPATIENT
Start: 2025-08-05 | End: 2025-09-04

## 2025-08-05 RX ORDER — GABAPENTIN 300 MG/1
300 CAPSULE ORAL
Status: COMPLETED | OUTPATIENT
Start: 2025-08-05 | End: 2025-08-05

## 2025-08-05 RX ORDER — NAPROXEN 500 MG/1
500 TABLET ORAL 2 TIMES DAILY PRN
Qty: 30 TABLET | Refills: 0 | Status: SHIPPED | OUTPATIENT
Start: 2025-08-05

## 2025-08-05 RX ORDER — GABAPENTIN 100 MG/1
100 CAPSULE ORAL 3 TIMES DAILY PRN
Qty: 30 CAPSULE | Refills: 0 | Status: SHIPPED | OUTPATIENT
Start: 2025-08-05 | End: 2026-02-01

## 2025-08-05 RX ADMIN — KETOROLAC TROMETHAMINE 15 MG: 15 INJECTION, SOLUTION INTRAMUSCULAR; INTRAVENOUS at 18:24

## 2025-08-05 RX ADMIN — GABAPENTIN 300 MG: 300 CAPSULE ORAL at 18:24

## 2025-08-05 ASSESSMENT — PAIN SCALES - GENERAL
PAINLEVEL_OUTOF10: 9
PAINLEVEL_OUTOF10: 8

## 2025-08-05 ASSESSMENT — PAIN - FUNCTIONAL ASSESSMENT: PAIN_FUNCTIONAL_ASSESSMENT: 0-10

## 2025-08-05 ASSESSMENT — PAIN DESCRIPTION - LOCATION: LOCATION: ARM

## 2025-08-11 LAB
EKG ATRIAL RATE: 76 BPM
EKG DIAGNOSIS: NORMAL
EKG Q-T INTERVAL: 536 MS
EKG QRS DURATION: 190 MS
EKG QTC CALCULATION (BAZETT): 557 MS
EKG R AXIS: 68 DEGREES
EKG T AXIS: -87 DEGREES
EKG VENTRICULAR RATE: 65 BPM